# Patient Record
Sex: MALE | Race: BLACK OR AFRICAN AMERICAN | Employment: FULL TIME | URBAN - METROPOLITAN AREA
[De-identification: names, ages, dates, MRNs, and addresses within clinical notes are randomized per-mention and may not be internally consistent; named-entity substitution may affect disease eponyms.]

---

## 2018-04-25 ENCOUNTER — APPOINTMENT (OUTPATIENT)
Dept: GENERAL RADIOLOGY | Age: 43
End: 2018-04-25
Attending: PHYSICIAN ASSISTANT
Payer: SELF-PAY

## 2018-04-25 ENCOUNTER — HOSPITAL ENCOUNTER (EMERGENCY)
Age: 43
Discharge: HOME | End: 2018-04-25
Attending: EMERGENCY MEDICINE
Payer: SELF-PAY

## 2018-04-25 VITALS
RESPIRATION RATE: 18 BRPM | HEART RATE: 76 BPM | SYSTOLIC BLOOD PRESSURE: 162 MMHG | BODY MASS INDEX: 33.34 KG/M2 | TEMPERATURE: 99.9 F | OXYGEN SATURATION: 100 % | HEIGHT: 68 IN | WEIGHT: 220 LBS | DIASTOLIC BLOOD PRESSURE: 109 MMHG

## 2018-04-25 DIAGNOSIS — Z76.0 MEDICATION REFILL: ICD-10-CM

## 2018-04-25 DIAGNOSIS — J20.9 ACUTE BRONCHITIS, UNSPECIFIED ORGANISM: Primary | ICD-10-CM

## 2018-04-25 LAB
FLUAV AG NPH QL IA: NEGATIVE
FLUBV AG NOSE QL IA: NEGATIVE

## 2018-04-25 PROCEDURE — 87804 INFLUENZA ASSAY W/OPTIC: CPT | Performed by: PHYSICIAN ASSISTANT

## 2018-04-25 PROCEDURE — 99283 EMERGENCY DEPT VISIT LOW MDM: CPT

## 2018-04-25 PROCEDURE — 74011250637 HC RX REV CODE- 250/637: Performed by: PHYSICIAN ASSISTANT

## 2018-04-25 PROCEDURE — 71046 X-RAY EXAM CHEST 2 VIEWS: CPT

## 2018-04-25 RX ORDER — AMLODIPINE BESYLATE 5 MG/1
10 TABLET ORAL
Status: COMPLETED | OUTPATIENT
Start: 2018-04-25 | End: 2018-04-25

## 2018-04-25 RX ORDER — METOPROLOL TARTRATE 50 MG/1
100 TABLET ORAL
Status: COMPLETED | OUTPATIENT
Start: 2018-04-25 | End: 2018-04-25

## 2018-04-25 RX ORDER — METOPROLOL SUCCINATE 100 MG/1
TABLET, EXTENDED RELEASE ORAL
Qty: 30 TAB | Refills: 0 | Status: SHIPPED | OUTPATIENT
Start: 2018-04-25

## 2018-04-25 RX ORDER — IBUPROFEN 400 MG/1
800 TABLET ORAL
Status: COMPLETED | OUTPATIENT
Start: 2018-04-25 | End: 2018-04-25

## 2018-04-25 RX ORDER — AMLODIPINE BESYLATE 10 MG/1
TABLET ORAL
Qty: 30 TAB | Refills: 0 | Status: SHIPPED | OUTPATIENT
Start: 2018-04-25

## 2018-04-25 RX ORDER — CODEINE PHOSPHATE AND GUAIFENESIN 10; 100 MG/5ML; MG/5ML
5 SOLUTION ORAL
Qty: 120 ML | Refills: 0 | Status: SHIPPED | OUTPATIENT
Start: 2018-04-25

## 2018-04-25 RX ADMIN — METOPROLOL TARTRATE 100 MG: 50 TABLET ORAL at 21:29

## 2018-04-25 RX ADMIN — AMLODIPINE BESYLATE 10 MG: 5 TABLET ORAL at 21:29

## 2018-04-25 RX ADMIN — IBUPROFEN 800 MG: 400 TABLET ORAL at 21:29

## 2018-04-25 NOTE — LETTER
[address] [place] [address] [facility] DE[facility] 
[address] [facility] [id number] [phone] Work/School Note Date: 4/25/2018 To Whom It May concern: 
 
Teto Butler was seen and treated today in the emergency room by the following provider(s): 
Attending Provider: Bni Cohen MD 
Physician Assistant: Anthony Serrano PA-C. Teto Butler may return to work on 4/27/18. Sincerely, Anthony Serrano PA-C

## 2018-04-26 ENCOUNTER — HOSPITAL ENCOUNTER (EMERGENCY)
Age: 43
Discharge: HOME | End: 2018-04-26
Attending: EMERGENCY MEDICINE
Payer: SELF-PAY

## 2018-04-26 VITALS
TEMPERATURE: 99.3 F | HEIGHT: 68 IN | DIASTOLIC BLOOD PRESSURE: 98 MMHG | WEIGHT: 215 LBS | HEART RATE: 90 BPM | BODY MASS INDEX: 32.58 KG/M2 | RESPIRATION RATE: 16 BRPM | OXYGEN SATURATION: 98 % | SYSTOLIC BLOOD PRESSURE: 145 MMHG

## 2018-04-26 DIAGNOSIS — J20.9 ACUTE BRONCHITIS, UNSPECIFIED ORGANISM: Primary | ICD-10-CM

## 2018-04-26 DIAGNOSIS — Z72.0 TOBACCO USE: ICD-10-CM

## 2018-04-26 PROCEDURE — 99282 EMERGENCY DEPT VISIT SF MDM: CPT

## 2018-04-26 NOTE — ED PROVIDER NOTES
EMERGENCY DEPARTMENT HISTORY AND PHYSICAL EXAM    Date: 4/25/2018  Patient Name: Micki Shetty    History of Presenting Illness     Chief Complaint   Patient presents with    Generalized Body Aches    Chills    Cough         History Provided By: Patient    Chief Complaint: cough, body aches  Duration: 1 Days  Timing:  Acute  Location: chest, body  Quality: Aching  Severity: Moderate  Modifying Factors: none  Associated Symptoms: fever      Additional History (Context): Micki Shetty is a 37 y.o. male with No significant past medical history who presents with cough, body aches and fever x 1 day. Last dose tylenol around 4 PM. No sick contacts. Denies CP, SOB, n/v. No other complaints. PCP: Carmelo Hilliard NP    Current Outpatient Prescriptions   Medication Sig Dispense Refill    metoprolol succinate (TOPROL-XL) 100 mg tablet TAKE 1 TAB BY MOUTH DAILY. 30 Tab 0    amLODIPine (NORVASC) 10 mg tablet TAKE 1 TAB BY MOUTH DAILY. 30 Tab 0    guaiFENesin-codeine (ROBITUSSIN AC) 100-10 mg/5 mL solution Take 5 mL by mouth nightly as needed for Cough. Max Daily Amount: 5 mL. 120 mL 0       Past History     Past Medical History:  Past Medical History:   Diagnosis Date    Abscess     GERD (gastroesophageal reflux disease)     Hypertension        Past Surgical History:  History reviewed. No pertinent surgical history. Family History:  Family History   Problem Relation Age of Onset    Cancer Mother     Diabetes Father     Hypertension Father        Social History:  Social History   Substance Use Topics    Smoking status: Light Tobacco Smoker    Smokeless tobacco: Never Used    Alcohol use No       Allergies:  No Known Allergies      Review of Systems   Review of Systems   Constitutional: Positive for chills and fever. HENT: Positive for sore throat. Respiratory: Positive for cough. Negative for shortness of breath. Musculoskeletal: Positive for myalgias.    All other systems reviewed and are negative. All Other Systems Negative  Physical Exam     Vitals:    04/25/18 2113 04/25/18 2208   BP: (!) 174/118 (!) 162/109   Pulse: 95 76   Resp: 18 18   Temp: 100.1 °F (37.8 °C) 99.9 °F (37.7 °C)   SpO2: 100%    Weight: 99.8 kg (220 lb)    Height: 5' 8\" (1.727 m)      Physical Exam   Constitutional: He is oriented to person, place, and time. He appears well-developed and well-nourished. No distress. HENT:   Head: Normocephalic and atraumatic. Right Ear: Tympanic membrane and ear canal normal.   Left Ear: Tympanic membrane and ear canal normal.   Mouth/Throat: Uvula is midline, oropharynx is clear and moist and mucous membranes are normal.   Eyes: Conjunctivae are normal.   Neck: Normal range of motion. Neck supple. Cardiovascular: Normal rate, regular rhythm and normal heart sounds. Pulmonary/Chest: Effort normal and breath sounds normal. No respiratory distress. He has no wheezes. He has no rales. Musculoskeletal: Normal range of motion. Neurological: He is alert and oriented to person, place, and time. Skin: Skin is warm and dry. Psychiatric: He has a normal mood and affect. His behavior is normal. Judgment and thought content normal.   Nursing note and vitals reviewed. Diagnostic Study Results     Labs -     Recent Results (from the past 12 hour(s))   INFLUENZA A & B AG (RAPID TEST)    Collection Time: 04/25/18  9:15 PM   Result Value Ref Range    Influenza A Antigen NEGATIVE  NEG      Influenza B Antigen NEGATIVE  NEG         Radiologic Studies -   XR CHEST PA LAT    (Results Pending)     CT Results  (Last 48 hours)    None        CXR Results  (Last 48 hours)    None            Medical Decision Making   I am the first provider for this patient. I reviewed the vital signs, available nursing notes, past medical history, past surgical history, family history and social history.     Records Reviewed: Nursing Notes and Old Medical Records    Procedures:  Procedures    Provider Notes (Medical Decision Making):     DDX: flu, pna, other viral illness    Pt well appearing and in NAD. CXR negative. Flu negative. Most likely viral. Pt also requesting refill of HTN meds. PCP f/u for further eval.     MED RECONCILIATION:  No current facility-administered medications for this encounter. Current Outpatient Prescriptions   Medication Sig    metoprolol succinate (TOPROL-XL) 100 mg tablet TAKE 1 TAB BY MOUTH DAILY.  amLODIPine (NORVASC) 10 mg tablet TAKE 1 TAB BY MOUTH DAILY.  guaiFENesin-codeine (ROBITUSSIN AC) 100-10 mg/5 mL solution Take 5 mL by mouth nightly as needed for Cough. Max Daily Amount: 5 mL. Disposition:  home    DISCHARGE NOTE:     Patient is improved, resting quietly and comfortably. The patient will be discharged home.      The patient was reassured that these symptoms do not appear to represent a serious or life threatening condition at this time. Warning signs of worsening condition were discussed and understood by the patient.      Based on patient's age, coexisting illness, exam, and the results of this ED evaluation, the decision to treat as an outpatient was made. Based on the information available at time of discharge, acute pathology requiring immediate intervention was deemed relative unlikely. While it is impossible to completely exclude the possibility of underlying serious disease or worsening of condition, I feel the relative likelihood is extremely low. I discussed this uncertainty with the patient, who understood ED evaluation and treatment and felt comfortable with the outpatient treatment plan.      All questions regarding care, test results, and follow up were answered. The patient is stable and appropriate to discharge. They understand that they should return to the emergency department for any new or worsening symptoms. I stressed the importance of follow up for repeat assessment and possibly further evaluation/treatment.     Follow-up Information     Follow up With Details Comments [address], NP In 2 days As needed [address]  [address]      [address] [facility] EMERGENCY DEPT  Immediately if symptoms worsen [address] [facility]  [phone]          Current Discharge Medication List      START taking these medications    Details   guaiFENesin-codeine (ROBITUSSIN AC) 100-10 mg/5 mL solution Take 5 mL by mouth nightly as needed for Cough. Max Daily Amount: 5 mL. Qty: 120 mL, Refills: 0    Associated Diagnoses: Acute bronchitis, unspecified organism         CONTINUE these medications which have CHANGED    Details   metoprolol succinate (TOPROL-XL) 100 mg tablet TAKE 1 TAB BY MOUTH DAILY. Qty: 30 Tab, Refills: 0    Associated Diagnoses: Medication refill      amLODIPine (NORVASC) 10 mg tablet TAKE 1 TAB BY MOUTH DAILY. Qty: 30 Tab, Refills: 0    Associated Diagnoses: Medication refill               Diagnosis     Clinical Impression:   1. Acute bronchitis, unspecified organism    2.  Medication refill

## 2018-04-26 NOTE — ED TRIAGE NOTES
Patient states onset of generalized body aches, chills, sweats and cough yesterday. States last dose of tylenol at 1600 today.

## 2018-04-26 NOTE — DISCHARGE INSTRUCTIONS
Bronchitis: Care Instructions  Your Care Instructions    Bronchitis is inflammation of the bronchial tubes, which carry air to the lungs. The tubes swell and produce mucus, or phlegm. The mucus and inflamed bronchial tubes make you cough. You may have trouble breathing. Most cases of bronchitis are caused by viruses like those that cause colds. Antibiotics usually do not help and they may be harmful. Bronchitis usually develops rapidly and lasts about 2 to 3 weeks in otherwise healthy people. Follow-up care is a key part of your treatment and safety. Be sure to make and go to all appointments, and call your doctor if you are having problems. It's also a good idea to know your test results and keep a list of the medicines you take. How can you care for yourself at home? · Take all medicines exactly as prescribed. Call your doctor if you think you are having a problem with your medicine. · Get some extra rest.  · Take an over-the-counter pain medicine, such as acetaminophen (Tylenol), ibuprofen (Advil, Motrin), or naproxen (Aleve) to reduce fever and relieve body aches. Read and follow all instructions on the label. · Do not take two or more pain medicines at the same time unless the doctor told you to. Many pain medicines have acetaminophen, which is Tylenol. Too much acetaminophen (Tylenol) can be harmful. · Take an over-the-counter cough medicine that contains dextromethorphan to help quiet a dry, hacking cough so that you can sleep. Avoid cough medicines that have more than one active ingredient. Read and follow all instructions on the label. · Breathe moist air from a humidifier, hot shower, or sink filled with hot water. The heat and moisture will thin mucus so you can cough it out. · Do not smoke. Smoking can make bronchitis worse. If you need help quitting, talk to your doctor about stop-smoking programs and medicines. These can increase your chances of quitting for good.   When should you call for help? Call 911 anytime you think you may need emergency care. For example, call if:  ? · You have severe trouble breathing. ?Call your doctor now or seek immediate medical care if:  ? · You have new or worse trouble breathing. ? · You cough up dark brown or bloody mucus (sputum). ? · You have a new or higher fever. ? · You have a new rash. ? Watch closely for changes in your health, and be sure to contact your doctor if:  ? · You cough more deeply or more often, especially if you notice more mucus or a change in the color of your mucus. ? · You are not getting better as expected. Where can you learn more? Go to http://carine-gisela.info/. Enter H333 in the search box to learn more about \"Bronchitis: Care Instructions. \"  Current as of: May 12, 2017  Content Version: 11.4  © 3480-3182 [employer]. Care instructions adapted under license by [employer] (which disclaims liability or warranty for this information). If you have questions about a medical condition or this instruction, always ask your healthcare professional. [address] any warranty or liability for your use of this information.

## 2018-04-26 NOTE — LETTER
[address] [place] [address] [facility] EMERGENC[facility] DEPT 
[address] [facility] [id number] [phone] Work/School Note Date: 4/26/2018 To Whom It May concern: 
 
Mino Reyes was seen and treated today in the emergency room by the following provider(s): 
Attending Provider: Abdullahi Burns MD 
Physician Assistant: ALEXANDREA Puckett. Mino Reyes may return to work on April 28 th, 2018. Sincerely, ALEXANDREA Puckett

## 2018-04-27 NOTE — ED PROVIDER NOTES
EMERGENCY DEPARTMENT HISTORY AND PHYSICAL EXAM    9:07 PM      Date: 4/26/2018  Patient Name: Cassandra Gomez    History of Presenting Illness     Chief Complaint   Patient presents with    Generalized Body Aches         History Provided By: Patient    Chief Complaint: has been  Having body ache and non productive and fever of 100 and was seen in the ER yesterday and underwent influenza and CXR which was negative. Per patient still feels of body ache and tactile fever. No diarrhea, chest pain, acute onset of SOB, palpitation or abd pain. Smokes tobacco x 5 yrs. Duration:  Days  Timing:  Gradual  Location: as noted above  Quality: Aching  Severity: Moderate  Modifying Factors: none  Associated Symptoms: denies any other associated signs or symptoms      Additional History (Context): Cassandra Gomez is a 37 y.o. male with hypertension and GERD who presents with sx as noted above. PCP: Lamar Jules NP    Current Outpatient Prescriptions   Medication Sig Dispense Refill    metoprolol succinate (TOPROL-XL) 100 mg tablet TAKE 1 TAB BY MOUTH DAILY. 30 Tab 0    amLODIPine (NORVASC) 10 mg tablet TAKE 1 TAB BY MOUTH DAILY. 30 Tab 0    guaiFENesin-codeine (ROBITUSSIN AC) 100-10 mg/5 mL solution Take 5 mL by mouth nightly as needed for Cough. Max Daily Amount: 5 mL. 120 mL 0       Past History     Past Medical History:  Past Medical History:   Diagnosis Date    Abscess     GERD (gastroesophageal reflux disease)     Hypertension        Past Surgical History:  History reviewed. No pertinent surgical history.     Family History:  Family History   Problem Relation Age of Onset    Cancer Mother     Diabetes Father     Hypertension Father        Social History:  Social History   Substance Use Topics    Smoking status: Light Tobacco Smoker    Smokeless tobacco: Never Used    Alcohol use No       Allergies:  No Known Allergies      Review of Systems       Review of Systems   Constitutional: Positive for chills and fever.   HENT: Negative for sinus pain, sinus pressure and sore throat. Respiratory: Positive for cough. Negative for chest tightness, shortness of breath and wheezing. All other systems reviewed and are negative. Physical Exam     Visit Vitals    BP (!) 145/98    Pulse 90    Temp 99.3 °F (37.4 °C)    Resp 16    Ht 5' 8\" (1.727 m)    Wt 97.5 kg (215 lb)    SpO2 98%    BMI 32.69 kg/m2         Physical Exam   Constitutional: He is oriented to person, place, and time. He appears well-developed and well-nourished. No distress. HENT:   Head: Normocephalic and atraumatic. Eyes: Conjunctivae and EOM are normal. Pupils are equal, round, and reactive to light. Neck: Normal range of motion. Cardiovascular: Normal rate, regular rhythm and normal heart sounds. Pulmonary/Chest: Effort normal and breath sounds normal. No respiratory distress. He has no wheezes. He has no rales. Abdominal: Soft. Bowel sounds are normal. He exhibits no distension. There is no tenderness. There is no rebound and no guarding. Musculoskeletal: Normal range of motion. Neurological: He is alert and oriented to person, place, and time. Skin: Skin is warm. He is not diaphoretic. Psychiatric: He has a normal mood and affect. His behavior is normal. Judgment and thought content normal.         Diagnostic Study Results     Labs -  No results found for this or any previous visit (from the past 12 hour(s)). Radiologic Studies -   No orders to display         Medical Decision Making   I am the first provider for this patient. I reviewed the vital signs, available nursing notes, past medical history, past surgical history, family history and social history. Vital Signs-Reviewed the patient's vital signs. Provider Notes (Medical Decision Making):   Recommend on increasing fluid intake and tylenol ibuprofen for body ache an fever. Diagnosis     Clinical Impression:   1.  Acute bronchitis, unspecified organism    2. Tobacco use        Disposition: HOME    Follow-up Information     Follow up With Details Comments [address], NP In 1 day  [address]  [address]      [address] [facility] EMERGENCY DEPT  As needed, If symptoms worsen [facility] [facility]  [phone]           Patient's Medications   Start Taking    No medications on file   Continue Taking    AMLODIPINE (NORVASC) 10 MG TABLET    TAKE 1 TAB BY MOUTH DAILY. GUAIFENESIN-CODEINE (ROBITUSSIN AC) 100-10 MG/5 ML SOLUTION    Take 5 mL by mouth nightly as needed for Cough. Max Daily Amount: 5 mL. METOPROLOL SUCCINATE (TOPROL-XL) 100 MG TABLET    TAKE 1 TAB BY MOUTH DAILY.    These Medications have changed    No medications on file   Stop Taking    No medications on file

## 2018-04-27 NOTE — DISCHARGE INSTRUCTIONS
Learning About Benefits From Quitting Smoking  How does quitting smoking make you healthier? If you're thinking about quitting smoking, you may have a few reasons to be smoke-free. Your health may be one of them. · When you quit smoking, you lower your risks for cancer, lung disease, heart attack, stroke, blood vessel disease, and blindness from macular degeneration. · When you're smoke-free, you get sick less often, and you heal faster. You are less likely to get colds, flu, bronchitis, and pneumonia. · As a nonsmoker, you may find that your mood is better and you are less stressed. When and how will you feel healthier? Quitting has real health benefits that start from day 1 of being smoke-free. And the longer you stay smoke-free, the healthier you get and the better you feel. The first hours  · After just 20 minutes, your blood pressure and heart rate go down. That means there's less stress on your heart and blood vessels. · Within 12 hours, the level of carbon monoxide in your blood drops back to normal. That makes room for more oxygen. With more oxygen in your body, you may notice that you have more energy than when you smoked. After 2 weeks  · Your lungs start to work better. · Your risk of heart attack starts to drop. After 1 month  · When your lungs are clear, you cough less and breathe deeper, so it's easier to be active. · Your sense of taste and smell return. That means you can enjoy food more than you have since you started smoking. Over the years  · After 1 year, your risk of heart disease is half what it would be if you kept smoking. · After 5 years, your risk of stroke starts to shrink. Within a few years after that, it's about the same as if you'd never smoked. · After 10 years, your risk of dying from lung cancer is cut by about half. And your risk for many other types of cancer is lower too. How would quitting help others in your life?   When you quit smoking, you improve the health of everyone who now breathes in your smoke. · Their heart, lung, and cancer risks drop, much like yours. · They are sick less. For babies and small children, living smoke-free means they're less likely to have ear infections, pneumonia, and bronchitis. · If you're a woman who is or will be pregnant someday, quitting smoking means a healthier . · Children who are close to you are less likely to become adult smokers. Where can you learn more? Go to http://carine-gisela.info/. Enter 052 [id number] 72 11 in the search box to learn more about \"Learning About Benefits From Quitting Smoking. \"  Current as of: 2017  Content Version: 11.4  © 9169-6691 [employer] Care instructions adapted under license by [employer] (which disclaims liability or warranty for this information). If you have questions about a medical condition or this instruction, always ask your healthcare professional. [place] [address] any warranty or liability for your use of this information. [employer] Activation    Thank you for requesting access to [employer]. Please follow the instructions below to securely access and download your online medical record. [employer] allows you to send messages to your doctor, view your test results, renew your prescriptions, schedule appointments, and more. How Do I Sign Up? 1. In your internet browser, go to [email or web address]  2. Click on the First Time User? Click Here link in the Sign In box. You will be redirect to the New Member Sign Up page. 3. Enter your [employer] Access Code exactly as it appears below. You will not need to use this code after youve completed the sign-up process. If you do not sign up before the expiration date, you must request a new code. [employer] Access Code: [email or web address]  Expires: 2018  9:11 PM (This is the date your [employer] access code will )    4.  Enter the last four digits of your Social Security Number (xxxx) and Date of Birth (mm/dd/yyyy) as indicated and click Submit. You will be taken to the next sign-up page. 5. Create a [employer] ID. This will be your [employer] login ID and cannot be changed, so think of one that is secure and easy to remember. 6. Create a [employer] password. You can change your password at any time. 7. Enter your Password Reset Question and Answer. This can be used at a later time if you forget your password. 8. Enter your e-mail address. You will receive e-mail notification when new information is available in [address] 9. Click Sign Up. You can now view and download portions of your medical record. 10. Click the Download Summary menu link to download a portable copy of your medical information. Additional Information    If you have questions, please visit the Frequently Asked Questions section of the [employer] website at [email or web address] Remember, [employer] is NOT to be used for urgent needs. For medical emergencies, dial 911.

## 2018-04-27 NOTE — ED TRIAGE NOTES
Patient report to ED requesting a work note extension. Patient seen on 4/25/18 and states he is not feeling any better.

## 2020-12-25 ENCOUNTER — HOSPITAL ENCOUNTER (EMERGENCY)
Age: 45
Discharge: HOME | End: 2020-12-26
Attending: EMERGENCY MEDICINE
Payer: OTHER GOVERNMENT

## 2020-12-25 VITALS
TEMPERATURE: 99.1 F | DIASTOLIC BLOOD PRESSURE: 115 MMHG | HEART RATE: 98 BPM | WEIGHT: 220 LBS | HEIGHT: 68 IN | RESPIRATION RATE: 20 BRPM | OXYGEN SATURATION: 100 % | SYSTOLIC BLOOD PRESSURE: 177 MMHG | BODY MASS INDEX: 33.34 KG/M2

## 2020-12-25 DIAGNOSIS — R04.0 EPISTAXIS: Primary | ICD-10-CM

## 2020-12-25 PROCEDURE — 99283 EMERGENCY DEPT VISIT LOW MDM: CPT

## 2020-12-25 PROCEDURE — 75810000284 HC CNTRL NASAL HEMORHRAGE SIMPLE

## 2020-12-26 PROCEDURE — 74011250637 HC RX REV CODE- 250/637: Performed by: EMERGENCY MEDICINE

## 2020-12-26 RX ORDER — AMLODIPINE BESYLATE 5 MG/1
10 TABLET ORAL
Status: COMPLETED | OUTPATIENT
Start: 2020-12-26 | End: 2020-12-26

## 2020-12-26 RX ORDER — AMLODIPINE BESYLATE 10 MG/1
10 TABLET ORAL DAILY
Qty: 20 TAB | Refills: 0 | Status: SHIPPED | OUTPATIENT
Start: 2020-12-26 | End: 2021-01-15

## 2020-12-26 RX ORDER — METOPROLOL SUCCINATE 100 MG/1
100 TABLET, EXTENDED RELEASE ORAL DAILY
Qty: 20 TAB | Refills: 0 | Status: SHIPPED | OUTPATIENT
Start: 2020-12-26 | End: 2021-01-15

## 2020-12-26 RX ADMIN — AMLODIPINE BESYLATE 10 MG: 5 TABLET ORAL at 00:40

## 2020-12-26 NOTE — ED PROVIDER NOTES
EMERGENCY DEPARTMENT HISTORY AND PHYSICAL EXAM    12:06 AM  Date: 12/25/2020  Patient Name: America Boyd    History of Presenting Illness     Chief Complaint   Patient presents with    Epistaxis        History Provided By: Patient    HPI: America Boyd is a 39 y.o. male with history of hypertension and medication noncompliance. Patient is presenting with epistaxis that started approximately 2 hours prior to arrival.  At that stopped before he got here but then it started again. Denies headache or other neurological symptoms. This is the first time he has epistaxis. Patient has not been taking his blood pressure medication in few months and he does not even remember where he placed them. Location:  Severity:  Timing/course: Onset/Duration:     PCP: None    Past History     Past Medical History:  Past Medical History:   Diagnosis Date    Abscess     GERD (gastroesophageal reflux disease)     Hypertension        Past Surgical History:  History reviewed. No pertinent surgical history. Family History:  Family History   Problem Relation Age of Onset   Eyvonne Clonts Cancer Mother     Diabetes Father     Hypertension Father        Social History:  Social History     Tobacco Use    Smoking status: Current Every Day Smoker     Packs/day: 0.00    Smokeless tobacco: Never Used    Tobacco comment: 3 black & mild cigars per day   Substance Use Topics    Alcohol use: No     Alcohol/week: 0.0 standard drinks    Drug use: Yes     Types: Marijuana       Allergies:  No Known Allergies    Review of Systems   Review of Systems   HENT: Positive for nosebleeds. All other systems reviewed and are negative. Physical Exam     Patient Vitals for the past 12 hrs:   Temp Pulse Resp BP SpO2   12/25/20 2349    (!) 177/115    12/25/20 2345 99.1 °F (37.3 °C) 98 20 (!) 193/106 100 %       Physical Exam  Vitals signs and nursing note reviewed. Constitutional:       Appearance: Normal appearance.    HENT:      Head: Normocephalic and atraumatic. Nose:      Left Nostril: Epistaxis present. Eyes:      Extraocular Movements: Extraocular movements intact. Neck:      Musculoskeletal: Normal range of motion and neck supple. Cardiovascular:      Rate and Rhythm: Normal rate. Pulmonary:      Effort: Pulmonary effort is normal. No respiratory distress. Musculoskeletal: Normal range of motion. Neurological:      General: No focal deficit present. Mental Status: He is alert and oriented to person, place, and time. Psychiatric:         Mood and Affect: Mood normal.         Behavior: Behavior normal.           Diagnostic Study Results     Labs -  No results found for this or any previous visit (from the past 12 hour(s)). Radiologic Studies -   No results found. Medical Decision Making     ED Course: Progress Notes, Reevaluation, and Consults:    12:06 AM Initial assessment performed. The patients presenting problems have been discussed, and they/their family are in agreement with the care plan formulated and outlined with them. I have encouraged them to ask questions as they arise throughout their visit. Provider Notes (Medical Decision Making): 51-year-old male presenting with epistaxis and elevated blood pressure. During my evaluation he had some active bleeding that was anterior from the left nostril. Given his elevated blood pressure I could not use phenylephrine or Afrin so I used Merisel packing and nasal clamp and it was applied for about 20 minutes. And then remove the packing and the clamp and there was no active bleeding anymore and the patient was tolerating p.o. and feels comfortable. It was given also his blood pressure medication and refill his prescription. I will send him home with a nasal clamp and provided him with care instructions and return precautions.     Procedures: Epistaxis Management    Date/Time: 12/26/2020 3:54 AM  Performed by: Latoya Carr MD  Authorized by: Magaly Marquis MD     Consent:     Consent obtained:  Verbal    Consent given by:  Patient    Risks discussed:  Bleeding, nasal injury and pain    Alternatives discussed:  Delayed treatment  Anesthesia (see MAR for exact dosages): Anesthesia method:  None  Procedure details:     Treatment site:  L anterior    Treatment method:  Merocel sponge    Treatment complexity:  Limited    Treatment episode: initial    Post-procedure details:     Assessment:  Bleeding stopped    Patient tolerance of procedure: Tolerated well, no immediate complications        Critical Care Time:     Vital Signs-Reviewed the patient's vital signs. Reviewed pt's pulse ox reading. EKG: Interpreted by the EP. Time Interpreted:    Rate:    Rhythm:    Interpretation:   Comparison:     Records Reviewed: Nursing Notes (Time of Review: 12:06 AM)  -I am the first provider for this patient.  -I reviewed the vital signs, available nursing notes, past medical history, past surgical history, family history and social history. Current Outpatient Medications   Medication Sig Dispense Refill    metoprolol succinate (TOPROL-XL) 100 mg tablet TAKE 1 TAB BY MOUTH DAILY. 30 Tab 0    amLODIPine (NORVASC) 10 mg tablet TAKE 1 TAB BY MOUTH DAILY. 30 Tab 0    guaiFENesin-codeine (ROBITUSSIN AC) 100-10 mg/5 mL solution Take 5 mL by mouth nightly as needed for Cough. Max Daily Amount: 5 mL. 120 mL 0        Clinical Impression     Clinical Impression: No diagnosis found. Disposition: dc        This note was dictated utilizing voice recognition software which may lead to typographical errors. I apologize in advance if the situation occurs. If questions arise please do not hesitate to contact me or call our department.     Magaly Elaine MD  12:06 AM

## 2020-12-26 NOTE — ED NOTES
12:42 AM  12/26/20     Discharge instructions given to Darian Thompson (name) with verbalization of understanding. Patient accompanied by self. Patient discharged with the following prescriptions metoprolol & amlodipine. Patient discharged to home (destination).       Tessa Grady

## 2022-08-23 ENCOUNTER — HOSPITAL ENCOUNTER (EMERGENCY)
Age: 47
Discharge: HOME | End: 2022-08-23
Attending: EMERGENCY MEDICINE
Payer: OTHER GOVERNMENT

## 2022-08-23 ENCOUNTER — APPOINTMENT (OUTPATIENT)
Dept: GENERAL RADIOLOGY | Age: 47
End: 2022-08-23
Attending: EMERGENCY MEDICINE
Payer: OTHER GOVERNMENT

## 2022-08-23 VITALS
DIASTOLIC BLOOD PRESSURE: 89 MMHG | WEIGHT: 235 LBS | RESPIRATION RATE: 18 BRPM | HEART RATE: 82 BPM | TEMPERATURE: 98.6 F | BODY MASS INDEX: 35.61 KG/M2 | HEIGHT: 68 IN | OXYGEN SATURATION: 100 % | SYSTOLIC BLOOD PRESSURE: 145 MMHG

## 2022-08-23 DIAGNOSIS — M54.16 LUMBAR RADICULOPATHY: Primary | ICD-10-CM

## 2022-08-23 DIAGNOSIS — R03.0 ELEVATED BLOOD PRESSURE READING: ICD-10-CM

## 2022-08-23 PROCEDURE — 96372 THER/PROPH/DIAG INJ SC/IM: CPT

## 2022-08-23 PROCEDURE — 74011250636 HC RX REV CODE- 250/636: Performed by: EMERGENCY MEDICINE

## 2022-08-23 PROCEDURE — 99284 EMERGENCY DEPT VISIT MOD MDM: CPT

## 2022-08-23 PROCEDURE — 73502 X-RAY EXAM HIP UNI 2-3 VIEWS: CPT

## 2022-08-23 RX ORDER — METOPROLOL SUCCINATE 200 MG/1
200 TABLET, EXTENDED RELEASE ORAL DAILY
COMMUNITY
Start: 2022-07-03

## 2022-08-23 RX ORDER — KETOROLAC TROMETHAMINE 15 MG/ML
15 INJECTION, SOLUTION INTRAMUSCULAR; INTRAVENOUS
Status: COMPLETED | OUTPATIENT
Start: 2022-08-23 | End: 2022-08-23

## 2022-08-23 RX ORDER — CYCLOBENZAPRINE HCL 10 MG
10 TABLET ORAL
Qty: 8 TABLET | Refills: 0 | Status: SHIPPED | OUTPATIENT
Start: 2022-08-23

## 2022-08-23 RX ORDER — METHYLPREDNISOLONE 4 MG/1
4 TABLET ORAL
Qty: 1 DOSE PACK | Refills: 0 | Status: SHIPPED | OUTPATIENT
Start: 2022-08-23

## 2022-08-23 RX ORDER — ACETAMINOPHEN AND CODEINE PHOSPHATE 300; 30 MG/1; MG/1
1 TABLET ORAL
Qty: 4 TABLET | Refills: 0 | Status: SHIPPED | OUTPATIENT
Start: 2022-08-23 | End: 2022-08-26

## 2022-08-23 RX ADMIN — KETOROLAC TROMETHAMINE 15 MG: 15 INJECTION, SOLUTION INTRAMUSCULAR; INTRAVENOUS at 07:36

## 2022-08-23 NOTE — Clinical Note
[address] [place] [address] [facility]  [id number] [id number] [phone]    Work/School Note    Date: 8/23/2022    To Whom It May concern:      Christina Gonzalez was seen and treated today in the emergency room by the following provider(s):  Attending Provider: Daisha Garcia MD.      Christina Gonzalez is excused from work/school on 08/23/22. He is clear to return to work/school on 08/24/22.         Sincerely,          Cynthia Lee MD

## 2022-08-23 NOTE — ED NOTES
Introduced self to pt, pt c/o of left hip pain that radiates down from left hip to left leg since Saturday. Pt denies any recent fall or injury to hip. Pt sitting in semi-flowers position, BP cuff and pulse ox placed on pt, call bell within reach, will continue to monitor.

## 2022-08-23 NOTE — ED TRIAGE NOTES
Patient states \"his left hip has been hurting since Saturday night\". Patient denies any injuries to his left hip, he has chronic pain in his left hip\". Patient states Diana Bass has been taking tylenol for his pain and his last dose of tylenol was last night around 2000 hrs\".

## 2022-08-23 NOTE — DISCHARGE INSTRUCTIONS
Make sure to follow-up with the spine center for reevaluation. Avoid heavy lifting and take medications as prescribed. I have given you some Tylenol with codeine but only take that if you are not getting any relief with over-the-counter Tylenol and your muscle relaxants and steroids. Please return if you are at all worsened or concerned, have difficulty walking, new numbness, new weakness, or you are at all concerned.

## 2022-08-23 NOTE — ED PROVIDER NOTES
EMERGENCY DEPARTMENT HISTORY AND PHYSICAL EXAM    7:36 AM      Date: 8/23/2022  Patient Name: Lynn Nunez    History of Presenting Illness     Chief Complaint   Patient presents with    Hip Pain         History Provided By: Patient  Location/Duration/Severity/Modifying factors   Patient is a 77-year-old male with a history of hypertension that presents emergency department complaint of left-sided hip pain radiating down to his knee. Patient notes that he was off last week and moved a dresser for his wife and said he felt a bit of a twinge into his back and did not think much of it and it improved over the next 48 hours. The patient subsequently has developed some pain into his left hip and it radiates down his leg and increases when he coughs or when he sneezes. Patient said he is never had symptoms like this before. Patient denies any numbness or tingling into his legs or any difficulties with bowel or bladder use. The patient denies any weakness into his leg. Patient denies any fevers or chills. Patient denies any previous back injuries. Patient works in [place] and occasionally smokes cigars, denies alcohol use, denies any drug use. There are no other known aggravating or alleviating factors. PCP: None    Current Facility-Administered Medications   Medication Dose Route Frequency Provider Last Rate Last Admin    ketorolac (TORADOL) injection 15 mg  15 mg IntraMUSCular NOW Hola Dia MD         Current Outpatient Medications   Medication Sig Dispense Refill    Toprol  mg XL tablet Take 200 mg by mouth daily. acetaminophen-codeine (Tylenol-Codeine #3) 300-30 mg per tablet Take 1 Tablet by mouth every four (4) hours as needed for Pain (For pain not responding to over-the-counter Tylenol) for up to 3 days. Max Daily Amount: 6 Tablets. 4 Tablet 0    cyclobenzaprine (FLEXERIL) 10 mg tablet Take 1 Tablet by mouth three (3) times daily as needed for Muscle Spasm(s).  8 Tablet 0 methylPREDNISolone (Medrol, Tan,) 4 mg tablet Take 1 Tablet by mouth Specific Days and Specific Times. 1 Dose Pack 0    amLODIPine (NORVASC) 10 mg tablet TAKE 1 TAB BY MOUTH DAILY. 30 Tab 0    metoprolol succinate (TOPROL-XL) 100 mg tablet TAKE 1 TAB BY MOUTH DAILY. 30 Tab 0    guaiFENesin-codeine (ROBITUSSIN AC) 100-10 mg/5 mL solution Take 5 mL by mouth nightly as needed for Cough. Max Daily Amount: 5 mL. 120 mL 0       Past History     Past Medical History:  Past Medical History:   Diagnosis Date    Abscess     GERD (gastroesophageal reflux disease)     Hypertension        Past Surgical History:  History reviewed. No pertinent surgical history. Family History:  Family History   Problem Relation Age of Onset    Cancer Mother     Diabetes Father     Hypertension Father        Social History:  Social History     Tobacco Use    Smoking status: Every Day     Packs/day: 0.00     Types: Cigarettes    Smokeless tobacco: Never    Tobacco comments:     3 black & mild cigars per day   Vaping Use    Vaping Use: Never used   Substance Use Topics    Alcohol use: No     Alcohol/week: 0.0 standard drinks    Drug use: Yes     Types: Marijuana       Allergies:  No Known Allergies      Review of Systems       Review of Systems   Constitutional:  Negative for activity change, fatigue and fever. HENT:  Negative for congestion and rhinorrhea. Eyes:  Negative for visual disturbance. Respiratory:  Negative for shortness of breath. Cardiovascular:  Negative for chest pain and palpitations. Gastrointestinal:  Negative for abdominal pain, diarrhea, nausea and vomiting. Genitourinary:  Negative for dysuria and hematuria. Musculoskeletal:  Positive for arthralgias and back pain. Skin:  Negative for rash. Neurological:  Negative for dizziness, weakness and light-headedness. All other systems reviewed and are negative.       Physical Exam   Visit Vitals  BP (!) 145/89 (BP 1 Location: Left upper arm, BP Patient Position: At rest;Sitting)   Pulse 82   Temp 98.6 °F (37 °C)   Resp 18   Ht 5' 8\" (1.727 m)   Wt 106.6 kg (235 lb)   SpO2 100%   BMI 35.73 kg/m²         Physical Exam  Vitals and nursing note reviewed. Constitutional:       General: He is not in acute distress. Appearance: He is well-developed. HENT:      Head: Normocephalic and atraumatic. Right Ear: External ear normal.      Left Ear: External ear normal.      Nose: Nose normal.   Eyes:      General: No scleral icterus. Conjunctiva/sclera: Conjunctivae normal.      Pupils: Pupils are equal, round, and reactive to light. Neck:      Thyroid: No thyromegaly. Vascular: No JVD. Trachea: No tracheal deviation. Cardiovascular:      Rate and Rhythm: Normal rate and regular rhythm. Heart sounds: Normal heart sounds. No murmur heard. No friction rub. No gallop. Pulmonary:      Effort: Pulmonary effort is normal.      Breath sounds: Normal breath sounds. Chest:      Chest wall: No tenderness. Abdominal:      General: Bowel sounds are normal. There is no distension. Palpations: Abdomen is soft. Tenderness: There is no abdominal tenderness. There is no guarding or rebound. Musculoskeletal:         General: No tenderness. Normal range of motion. Cervical back: Normal range of motion and neck supple. Comments: Left-sided paraspinal lumbar tenderness, straight leg raising increasing pain at the hip and down the left leg, pain does not go beyond the knee  No pain with internal/external rotation of the left hip   Lymphadenopathy:      Cervical: No cervical adenopathy. Skin:     General: Skin is warm and dry. Neurological:      Mental Status: He is alert and oriented to person, place, and time. Cranial Nerves: No cranial nerve deficit. Coordination: Coordination normal.      Comments: No sensory loss, Gait normal, Motor 5/5   Psychiatric:         Behavior: Behavior normal.         Thought Content:  Thought content normal.         Judgment: Judgment normal.         Diagnostic Study Results     Labs -  No results found for this or any previous visit (from the past 12 hour(s)). Radiologic Studies -   XR HIP LT W OR WO PELV 2-3 VWS    (Results Pending)     No acute process interpreted by me    Medical Decision Making   I am the first provider for this patient. I reviewed the vital signs, available nursing notes, past medical history, past surgical history, family history and social history. Vital Signs-Reviewed the patient's vital signs. Records Reviewed: Nursing Notes, Old Medical Records, Previous Radiology Studies, and Previous Laboratory Studies (Time of Review: 7:36 AM)    ED Course: Progress Notes, Reevaluation, and Consults: The patient is somewhat improved after Toradol and x-ray shows no clear bony abnormality of the hip. I suspect the patient has lumbar radiculopathy and will start supportive care with steroids and patient will follow closely with the spine center for reevaluation to see if further testing or imaging is needed. The patient was educated on signs worsening and will return if at all worsened or concerned. Workup and recommendations were reviewed with the patient and all questions were answered. The patient understands the plan and will proceed with close outpatient care. I have encouraged the patient to return if at all worsened or concerned. Renzo Marley DO 7:51 AM      Provider Notes (Medical Decision Making):   MDM  Number of Diagnoses or Management Options  Elevated blood pressure reading  Lumbar radiculopathy  Diagnosis management comments: Patient is a 42-year-old male with history of hypertension who presents emergency department complaint of left hip pain that increases with Valsalva. Suspect the patient has lumbar radiculopathy and likely due to injury that occurred while moving a dresser at home.   Will x-ray his left hip for bony abnormalities, and if reassuring we will plan to discharge the patient with steroids, supportive care, and close outpatient care with the spine center. Stormy Tamayo,  7:39 AM          Procedures          Diagnosis     Clinical Impression:   1. Lumbar radiculopathy        Disposition: DC    Follow-up Information       Follow up With Specialties Details Why [address] [facility] In 2 days  [address]    [id number] [facility] EMERGENCY DEPT Emergency Medicine  As needed, If symptoms worsen [address] [facility]  [phone]             Patient's Medications   Start Taking    ACETAMINOPHEN-CODEINE (TYLENOL-CODEINE #3) 300-30 MG PER TABLET    Take 1 Tablet by mouth every four (4) hours as needed for Pain (For pain not responding to over-the-counter Tylenol) for up to 3 days. Max Daily Amount: 6 Tablets. CYCLOBENZAPRINE (FLEXERIL) 10 MG TABLET    Take 1 Tablet by mouth three (3) times daily as needed for Muscle Spasm(s). METHYLPREDNISOLONE (MEDROL, SUNDEEP,) 4 MG TABLET    Take 1 Tablet by mouth Specific Days and Specific Times. Continue Taking    AMLODIPINE (NORVASC) 10 MG TABLET    TAKE 1 TAB BY MOUTH DAILY. GUAIFENESIN-CODEINE (ROBITUSSIN AC) 100-10 MG/5 ML SOLUTION    Take 5 mL by mouth nightly as needed for Cough. Max Daily Amount: 5 mL. METOPROLOL SUCCINATE (TOPROL-XL) 100 MG TABLET    TAKE 1 TAB BY MOUTH DAILY. TOPROL  MG XL TABLET    Take 200 mg by mouth daily. These Medications have changed    No medications on file   Stop Taking    No medications on file     Disclaimer: Sections of this note are dictated using utilizing voice recognition software. Minor typographical errors may be present. If questions arise, please do not hesitate to contact me or call our department.

## 2022-11-09 ENCOUNTER — HOSPITAL ENCOUNTER (EMERGENCY)
Age: 47
Discharge: HOME | End: 2022-11-09
Attending: EMERGENCY MEDICINE
Payer: OTHER GOVERNMENT

## 2022-11-09 VITALS
HEART RATE: 90 BPM | HEIGHT: 68 IN | BODY MASS INDEX: 33.34 KG/M2 | SYSTOLIC BLOOD PRESSURE: 157 MMHG | OXYGEN SATURATION: 100 % | DIASTOLIC BLOOD PRESSURE: 105 MMHG | TEMPERATURE: 97.7 F | RESPIRATION RATE: 18 BRPM | WEIGHT: 220 LBS

## 2022-11-09 DIAGNOSIS — M54.31 SCIATICA OF RIGHT SIDE: Primary | ICD-10-CM

## 2022-11-09 PROCEDURE — 99284 EMERGENCY DEPT VISIT MOD MDM: CPT

## 2022-11-09 PROCEDURE — 74011250636 HC RX REV CODE- 250/636: Performed by: EMERGENCY MEDICINE

## 2022-11-09 PROCEDURE — 96372 THER/PROPH/DIAG INJ SC/IM: CPT

## 2022-11-09 RX ORDER — KETOROLAC TROMETHAMINE 15 MG/ML
15 INJECTION, SOLUTION INTRAMUSCULAR; INTRAVENOUS ONCE
Status: COMPLETED | OUTPATIENT
Start: 2022-11-09 | End: 2022-11-09

## 2022-11-09 RX ORDER — KETOROLAC TROMETHAMINE 15 MG/ML
30 INJECTION, SOLUTION INTRAMUSCULAR; INTRAVENOUS ONCE
Status: DISCONTINUED | OUTPATIENT
Start: 2022-11-09 | End: 2022-11-09

## 2022-11-09 RX ORDER — NAPROXEN 375 MG/1
375 TABLET ORAL 2 TIMES DAILY WITH MEALS
Qty: 15 TABLET | Refills: 0 | Status: SHIPPED | OUTPATIENT
Start: 2022-11-09

## 2022-11-09 RX ORDER — PREDNISONE 10 MG/1
TABLET ORAL
Qty: 1 DOSE PACK | Refills: 0 | Status: SHIPPED | OUTPATIENT
Start: 2022-11-09 | End: 2022-11-15

## 2022-11-09 RX ORDER — METHOCARBAMOL 750 MG/1
750 TABLET, FILM COATED ORAL
Qty: 10 TABLET | Refills: 0 | Status: SHIPPED | OUTPATIENT
Start: 2022-11-09 | End: 2022-11-23

## 2022-11-09 RX ADMIN — KETOROLAC TROMETHAMINE 15 MG: 15 INJECTION, SOLUTION INTRAMUSCULAR; INTRAVENOUS at 06:36

## 2022-11-09 NOTE — ED TRIAGE NOTES
A&O male with right sided lower back that radiates down right leg x 2 days. No relief from OTC pain medications. Does not recall any specific injury/trauma.

## 2022-11-09 NOTE — DISCHARGE INSTRUCTIONS
Medications: Please take all medications as prescribed and read all medication instructions/inserts. Sedating medications received: It is not uncommon to receive medications in the ER which can impair your capability to drive. If you received these medications then you should NOT drive as this could result in an accident. Followup: The exam and treatment you received in the Emergency Department were for an urgent problem and are not intended as complete care. It is important that you follow-up with a doctor, nurse practitioner, or physician assistant to:  (1) confirm your diagnosis,  (2) re-evaluation of changes in your illness and treatment, and  (3) for ongoing care (4) to review your testing performed in the emergency department and determine if further evaluation is required (especially for findings not related to your visit). Some disease processes can present early or atypically, If your symptoms become worse or you do not improve as expected and you are unable to reach your usual health care provider, you should return to the Emergency Department. We are available 24 hours a day. Incidental Findings: We attempt to communicate incidental and other abnormal findings with you today (these may or may not be related to your visit). These findings may require further testing so it is imperative that you followup with your primary care provider in 1-2 days provider to go over your test results and get further evaluation if needed.
General

## 2022-11-09 NOTE — PROGRESS NOTES
Ketorolac 15mg IM was therapeutically interchanged for ketorolac 30mg per the P&T Committee approved Therapeutic Interchanges Policy.     Radha Mayfield, [facility], Pharmacist  11/9/2022 6:27 AM

## 2022-11-09 NOTE — ED PROVIDER NOTES
[facility] EMERGENCY DEPT      ICD-10-CM ICD-9-CM    1. Sciatica of right side  M54.31 724. 3             Assessment/Differential Diagnosis:  Consider musculoskeletal strain, degenerative disc disease, referred pain, vertebral compression fracture, disk herniation, metastatic disease, vertebral osteomyelitis, epidural abscess      ED Course/Medical Decision Making:  Pt is 52 y.o. male who is well appearing, nontoxic. The neurological exam is normal and pt is afebrile. Signs and symptoms consistent with mechanical low back pain / myofacial strain or exacerbation of previous symptoms. Do not think pt needs emergent MRI. Not suspicious of vascular or infectious etiology. Doubt acute fracture. No evidence for cauda equina syndrome. Plan will be for analgesics, supportive and expectant care. Fu with PCP for further evaluation. Discussed with pt that outpt MRI would be diagnostic test of choice and the need to return with any new or worsening symptoms chris weakness, bowel/bladder issues, neuro changes. Disposition:  Discharged      Chief Complaint   Patient presents with    Back Pain       HPI:  Jacque Jenkins is a 52 y.o. male with history of HTN who presents with right-sided lower back pain radiating into his right leg. Patient states that it started gradually approximately 1 day ago and then it gradually got worse over the period of the night. Patient says that he was not able to sleep and that his symptoms are rated as severe and do not improve with any type of positioning. Tried some over-the-counter medications as well which did not alleviate his symptoms. Denies bowel or bladder symptoms, no loss of strength in extremities, no saddle anesthesias. There has been no history of trauma. Nothing seems to make the pain better. Review of Systems:    Review of Systems   Constitutional:  Negative for fatigue and fever. HENT:  Negative for sore throat and trouble swallowing.     Eyes:  Negative for photophobia and visual disturbance. Respiratory:  Negative for cough and shortness of breath. Cardiovascular:  Negative for chest pain and palpitations. Gastrointestinal:  Negative for abdominal pain and diarrhea. Genitourinary:  Negative for dysuria and flank pain. Musculoskeletal:  Positive for back pain. Negative for neck pain and neck stiffness. Skin:  Negative for pallor and rash. Neurological:  Negative for weakness and numbness. Past Medical History:   Diagnosis Date    Abscess     GERD (gastroesophageal reflux disease)     Hypertension      History reviewed. No pertinent surgical history. Family History   Problem Relation Age of Onset    Cancer Mother     Diabetes Father     Hypertension Father      No Known Allergies    Vital Signs:  Patient Vitals for the past 12 hrs:   Temp Pulse Resp BP SpO2   11/09/22 0601 97.7 °F (36.5 °C) 90 18 (!) 157/105 100 %       Physical Exam:    Physical Exam  Constitutional:       General: He is not in acute distress. Appearance: Normal appearance. He is not ill-appearing. HENT:      Head: Normocephalic and atraumatic. Right Ear: External ear normal.      Left Ear: External ear normal.      Nose: No congestion. Mouth/Throat:      Pharynx: No oropharyngeal exudate or posterior oropharyngeal erythema. Eyes:      General:         Right eye: No discharge. Left eye: No discharge. Cardiovascular:      Heart sounds: No murmur heard. No friction rub. Pulmonary:      Effort: No respiratory distress. Breath sounds: No stridor. Abdominal:      General: There is no distension. Palpations: There is no mass. Musculoskeletal:         General: Tenderness (+ TTP over the ischial tuberosity on the right - reproduction of symptoms. ) present. No swelling. Cervical back: No rigidity or tenderness. Skin:     Coloration: Skin is not jaundiced or pale. Neurological:      General: No focal deficit present.       Mental Status: He is alert and oriented to person, place, and time. GCS: GCS eye subscore is 4. GCS verbal subscore is 5. GCS motor subscore is 6. Cranial Nerves: No cranial nerve deficit, dysarthria or facial asymmetry. Sensory: No sensory deficit. Motor: No weakness. Coordination: Coordination normal.      Deep Tendon Reflexes:      Reflex Scores:       Patellar reflexes are 2+ on the right side and 2+ on the left side. Documentation Review:  Nursing notes, previous records and radiologic studies as available via chart review    Diagnostics:  Labs -  No results found for this or any previous visit (from the past 24 hour(s)).       Radiologic Studies -   No orders to display       Broderick Romero MD

## 2025-02-24 ENCOUNTER — APPOINTMENT (OUTPATIENT)
Facility: HOSPITAL | Age: 50
End: 2025-02-24
Payer: OTHER GOVERNMENT

## 2025-02-24 ENCOUNTER — HOSPITAL ENCOUNTER (EMERGENCY)
Facility: HOSPITAL | Age: 50
Discharge: HOME | End: 2025-02-24
Attending: EMERGENCY MEDICINE
Payer: OTHER GOVERNMENT

## 2025-02-24 VITALS
HEIGHT: 68 IN | BODY MASS INDEX: 31.83 KG/M2 | SYSTOLIC BLOOD PRESSURE: 129 MMHG | HEART RATE: 78 BPM | DIASTOLIC BLOOD PRESSURE: 76 MMHG | TEMPERATURE: 97.6 F | WEIGHT: 210 LBS | RESPIRATION RATE: 14 BRPM | OXYGEN SATURATION: 99 %

## 2025-02-24 DIAGNOSIS — R10.84 GENERALIZED ABDOMINAL PAIN: Primary | ICD-10-CM

## 2025-02-24 LAB
ALBUMIN SERPL-MCNC: 4.2 G/DL (ref 3.4–5)
ALBUMIN/GLOB SERPL: 1.1 (ref 0.8–1.7)
ALP SERPL-CCNC: 76 U/L (ref 45–117)
ALT SERPL-CCNC: 19 U/L (ref 16–61)
ANION GAP SERPL CALC-SCNC: 9 MMOL/L (ref 3–18)
APPEARANCE UR: CLEAR
AST SERPL-CCNC: 10 U/L (ref 10–38)
BACTERIA URNS QL MICRO: NEGATIVE /HPF
BASOPHILS # BLD: 0.03 K/UL (ref 0–0.1)
BASOPHILS NFR BLD: 0.2 % (ref 0–2)
BILIRUB SERPL-MCNC: 0.6 MG/DL (ref 0.2–1)
BILIRUB UR QL: NEGATIVE
BUN SERPL-MCNC: 11 MG/DL (ref 7–18)
BUN/CREAT SERPL: 10 (ref 12–20)
CALCIUM SERPL-MCNC: 9.5 MG/DL (ref 8.5–10.1)
CHLORIDE SERPL-SCNC: 101 MMOL/L (ref 100–111)
CO2 SERPL-SCNC: 25 MMOL/L (ref 21–32)
COLOR UR: YELLOW
CREAT SERPL-MCNC: 1.1 MG/DL (ref 0.6–1.3)
DIFFERENTIAL METHOD BLD: ABNORMAL
EOSINOPHIL # BLD: 0.04 K/UL (ref 0–0.4)
EOSINOPHIL NFR BLD: 0.3 % (ref 0–5)
EPITH CASTS URNS QL MICRO: ABNORMAL /LPF (ref 0–5)
ERYTHROCYTE [DISTWIDTH] IN BLOOD BY AUTOMATED COUNT: 13.4 % (ref 11.6–14.5)
GLOBULIN SER CALC-MCNC: 3.9 G/DL (ref 2–4)
GLUCOSE SERPL-MCNC: 196 MG/DL (ref 74–99)
GLUCOSE UR STRIP.AUTO-MCNC: NEGATIVE MG/DL
HCT VFR BLD AUTO: 38.7 % (ref 36–48)
HGB BLD-MCNC: 13.4 G/DL (ref 13–16)
HGB UR QL STRIP: NEGATIVE
IMM GRANULOCYTES # BLD AUTO: 0.03 K/UL (ref 0–0.04)
IMM GRANULOCYTES NFR BLD AUTO: 0.2 % (ref 0–0.5)
KETONES UR QL STRIP.AUTO: 15 MG/DL
LEUKOCYTE ESTERASE UR QL STRIP.AUTO: ABNORMAL
LIPASE SERPL-CCNC: 48 U/L (ref 13–75)
LYMPHOCYTES # BLD: 2 K/UL (ref 0.9–3.6)
LYMPHOCYTES NFR BLD: 14 % (ref 21–52)
MCH RBC QN AUTO: 31 PG (ref 24–34)
MCHC RBC AUTO-ENTMCNC: 34.6 G/DL (ref 31–37)
MCV RBC AUTO: 89.6 FL (ref 78–100)
MONOCYTES # BLD: 0.64 K/UL (ref 0.05–1.2)
MONOCYTES NFR BLD: 4.5 % (ref 3–10)
MUCOUS THREADS URNS QL MICRO: ABNORMAL /LPF
NEUTS SEG # BLD: 11.53 K/UL (ref 1.8–8)
NEUTS SEG NFR BLD: 80.8 % (ref 40–73)
NITRITE UR QL STRIP.AUTO: NEGATIVE
NRBC # BLD: 0 K/UL (ref 0–0.01)
NRBC BLD-RTO: 0 PER 100 WBC
PH UR STRIP: 6.5 (ref 5–8)
PLATELET # BLD AUTO: 380 K/UL (ref 135–420)
PMV BLD AUTO: 9.3 FL (ref 9.2–11.8)
POTASSIUM SERPL-SCNC: 3.3 MMOL/L (ref 3.5–5.5)
PROT SERPL-MCNC: 8.1 G/DL (ref 6.4–8.2)
PROT UR STRIP-MCNC: 100 MG/DL
RBC # BLD AUTO: 4.32 M/UL (ref 4.35–5.65)
RBC #/AREA URNS HPF: NEGATIVE /HPF (ref 0–5)
SODIUM SERPL-SCNC: 135 MMOL/L (ref 136–145)
SP GR UR REFRACTOMETRY: 1.02 (ref 1–1.03)
UROBILINOGEN UR QL STRIP.AUTO: 1 EU/DL (ref 0.2–1)
WBC # BLD AUTO: 14.3 K/UL (ref 4.6–13.2)
WBC URNS QL MICRO: ABNORMAL /HPF (ref 0–4)

## 2025-02-24 PROCEDURE — 6370000000 HC RX 637 (ALT 250 FOR IP): Performed by: EMERGENCY MEDICINE

## 2025-02-24 PROCEDURE — 6360000002 HC RX W HCPCS: Performed by: EMERGENCY MEDICINE

## 2025-02-24 PROCEDURE — 99285 EMERGENCY DEPT VISIT HI MDM: CPT

## 2025-02-24 PROCEDURE — 80053 COMPREHEN METABOLIC PANEL: CPT

## 2025-02-24 PROCEDURE — 6360000004 HC RX CONTRAST MEDICATION: Performed by: EMERGENCY MEDICINE

## 2025-02-24 PROCEDURE — 96375 TX/PRO/DX INJ NEW DRUG ADDON: CPT

## 2025-02-24 PROCEDURE — 2580000003 HC RX 258: Performed by: EMERGENCY MEDICINE

## 2025-02-24 PROCEDURE — 2500000003 HC RX 250 WO HCPCS: Performed by: EMERGENCY MEDICINE

## 2025-02-24 PROCEDURE — 83690 ASSAY OF LIPASE: CPT

## 2025-02-24 PROCEDURE — 81001 URINALYSIS AUTO W/SCOPE: CPT

## 2025-02-24 PROCEDURE — 85025 COMPLETE CBC W/AUTO DIFF WBC: CPT

## 2025-02-24 PROCEDURE — 96374 THER/PROPH/DIAG INJ IV PUSH: CPT

## 2025-02-24 PROCEDURE — 74177 CT ABD & PELVIS W/CONTRAST: CPT

## 2025-02-24 RX ORDER — IOPAMIDOL 612 MG/ML
100 INJECTION, SOLUTION INTRAVASCULAR
Status: COMPLETED | OUTPATIENT
Start: 2025-02-24 | End: 2025-02-24

## 2025-02-24 RX ORDER — MORPHINE SULFATE 4 MG/ML
4 INJECTION, SOLUTION INTRAMUSCULAR; INTRAVENOUS AS NEEDED
Status: COMPLETED | OUTPATIENT
Start: 2025-02-24 | End: 2025-02-24

## 2025-02-24 RX ADMIN — MORPHINE SULFATE 4 MG: 4 INJECTION, SOLUTION INTRAMUSCULAR; INTRAVENOUS at 09:11

## 2025-02-24 RX ADMIN — LIDOCAINE HYDROCHLORIDE 40 ML: 20 SOLUTION ORAL at 08:05

## 2025-02-24 RX ADMIN — FAMOTIDINE 20 MG: 10 INJECTION, SOLUTION INTRAVENOUS at 08:04

## 2025-02-24 RX ADMIN — IOPAMIDOL 100 ML: 612 INJECTION, SOLUTION INTRAVENOUS at 09:34

## 2025-02-24 ASSESSMENT — LIFESTYLE VARIABLES
HOW MANY STANDARD DRINKS CONTAINING ALCOHOL DO YOU HAVE ON A TYPICAL DAY: 1 OR 2
HOW OFTEN DO YOU HAVE A DRINK CONTAINING ALCOHOL: MONTHLY OR LESS

## 2025-02-24 ASSESSMENT — PAIN SCALES - GENERAL
PAINLEVEL_OUTOF10: 3
PAINLEVEL_OUTOF10: 10
PAINLEVEL_OUTOF10: 8
PAINLEVEL_OUTOF10: 0

## 2025-02-24 ASSESSMENT — PAIN DESCRIPTION - DESCRIPTORS: DESCRIPTORS: THROBBING

## 2025-02-24 ASSESSMENT — PAIN - FUNCTIONAL ASSESSMENT
PAIN_FUNCTIONAL_ASSESSMENT: 0-10
PAIN_FUNCTIONAL_ASSESSMENT: INTOLERABLE, UNABLE TO DO ANY ACTIVE OR PASSIVE ACTIVITIES
PAIN_FUNCTIONAL_ASSESSMENT: 0-10

## 2025-02-24 ASSESSMENT — PAIN DESCRIPTION - PAIN TYPE: TYPE: ACUTE PAIN

## 2025-02-24 ASSESSMENT — PAIN DESCRIPTION - LOCATION: LOCATION: ABDOMEN

## 2025-02-24 ASSESSMENT — PAIN DESCRIPTION - ONSET: ONSET: GRADUAL

## 2025-02-24 ASSESSMENT — PAIN DESCRIPTION - FREQUENCY: FREQUENCY: CONTINUOUS

## 2025-02-24 NOTE — ED PROVIDER NOTES
Chief Complaint  Severe stomach pain since 5:30 this morning  History of Present Illness (HPI)  Lobo Brown is a 50-year-old male with a history of diabetes and high blood pressure. He presents with severe stomach pain that started at 5:30 AM today. The pain is described as severe and throbbing, located at the top of his stomach. He has vomited three times, with the vomit being clear. He denies diarrhea, chest pain, coughing, back pain, or fever. The patient reports that he occasionally experiences stomach pain related to his diabetes and Ozempic use, but this episode is more severe and persistent than usual. He states, 'This didn't go away at all,' and 'This has been consistent since 5:30 a.m.' The patient's wife brought him to the hospital, and this is his first time seeking medical attention for this issue.  Surgical History  Not provided  Medical History  Diabetes  High blood pressure  Social History  The patient smokes at home. His wife brought him to the hospital for this visit.  Family History  Not provided  Allergies  Patient has allergies, but specific allergens are not provided in the transcript.  Medications  Ozempic - for diabetes (dosage and frequency not provided)  Review of Systems (ROS)  Constitutional: Patient reports feeling unwell  Gastrointestinal: Positive for severe stomach pain and vomiting. Denies diarrhea.  Cardiovascular: Denies chest pain.  Respiratory: Denies coughing.  Musculoskeletal: Denies back pain.  General: Denies fever.  Physical Examination  General Appearance: Patient looks very uncomfortable.  Skin: No abnormal skin color noted.  Lungs: Clear bilaterally.  Abdomen: Epigastric tenderness noted. Pain elicited when pushing on the top of the stomach.  Vital signs not provided in the transcript.  Diagnostic Tests  No diagnostic tests mentioned in the transcript.  Assessment and Plan  Assessment:  50-year-old male with a history of diabetes and hypertension presenting with

## 2025-02-24 NOTE — ED TRIAGE NOTES
Patient arrived to ER with complaints of upper abdominal pain, started this morning 0530. Denies diarrhea, coughing, or chest pain. Pt is diabetic and uses ozempic, has nausea but usually is not this bad.

## 2025-03-10 ENCOUNTER — HOSPITAL ENCOUNTER (EMERGENCY)
Facility: HOSPITAL | Age: 50
Discharge: HOME | End: 2025-03-10
Payer: OTHER GOVERNMENT

## 2025-03-10 ENCOUNTER — APPOINTMENT (OUTPATIENT)
Facility: HOSPITAL | Age: 50
End: 2025-03-10
Payer: OTHER GOVERNMENT

## 2025-03-10 VITALS
WEIGHT: 200 LBS | SYSTOLIC BLOOD PRESSURE: 122 MMHG | TEMPERATURE: 97 F | HEIGHT: 68 IN | OXYGEN SATURATION: 98 % | HEART RATE: 96 BPM | RESPIRATION RATE: 16 BRPM | BODY MASS INDEX: 30.31 KG/M2 | DIASTOLIC BLOOD PRESSURE: 58 MMHG

## 2025-03-10 DIAGNOSIS — K52.9 ENTERITIS: ICD-10-CM

## 2025-03-10 DIAGNOSIS — R11.2 NAUSEA AND VOMITING, UNSPECIFIED VOMITING TYPE: Primary | ICD-10-CM

## 2025-03-10 DIAGNOSIS — E87.6 HYPOKALEMIA: ICD-10-CM

## 2025-03-10 LAB
ALBUMIN SERPL-MCNC: 4.2 G/DL (ref 3.4–5)
ALBUMIN/GLOB SERPL: 1.2 (ref 0.8–1.7)
ALP SERPL-CCNC: 64 U/L (ref 45–117)
ALT SERPL-CCNC: 18 U/L (ref 16–61)
AMPHET UR QL SCN: NEGATIVE
ANION GAP SERPL CALC-SCNC: 11 MMOL/L (ref 3–18)
APPEARANCE UR: CLEAR
AST SERPL-CCNC: 10 U/L (ref 10–38)
BACTERIA URNS QL MICRO: NEGATIVE /HPF
BARBITURATES UR QL SCN: NEGATIVE
BASOPHILS # BLD: 0.03 K/UL (ref 0–0.1)
BASOPHILS NFR BLD: 0.2 % (ref 0–2)
BENZODIAZ UR QL: NEGATIVE
BILIRUB SERPL-MCNC: 0.4 MG/DL (ref 0.2–1)
BILIRUB UR QL: NEGATIVE
BUN SERPL-MCNC: 13 MG/DL (ref 7–18)
BUN/CREAT SERPL: 11 (ref 12–20)
CALCIUM SERPL-MCNC: 9.5 MG/DL (ref 8.5–10.1)
CANNABINOIDS UR QL SCN: POSITIVE
CHLORIDE SERPL-SCNC: 104 MMOL/L (ref 100–111)
CO2 SERPL-SCNC: 23 MMOL/L (ref 21–32)
COCAINE UR QL SCN: NEGATIVE
COLOR UR: YELLOW
CREAT SERPL-MCNC: 1.14 MG/DL (ref 0.6–1.3)
DIFFERENTIAL METHOD BLD: ABNORMAL
EKG ATRIAL RATE: 96 BPM
EKG DIAGNOSIS: NORMAL
EKG P AXIS: 54 DEGREES
EKG P-R INTERVAL: 160 MS
EKG Q-T INTERVAL: 384 MS
EKG QRS DURATION: 88 MS
EKG QTC CALCULATION (BAZETT): 485 MS
EKG R AXIS: 17 DEGREES
EKG T AXIS: 44 DEGREES
EKG VENTRICULAR RATE: 96 BPM
EOSINOPHIL # BLD: 0.03 K/UL (ref 0–0.4)
EOSINOPHIL NFR BLD: 0.2 % (ref 0–5)
EPITH CASTS URNS QL MICRO: ABNORMAL /LPF (ref 0–5)
ERYTHROCYTE [DISTWIDTH] IN BLOOD BY AUTOMATED COUNT: 13.1 % (ref 11.6–14.5)
GLOBULIN SER CALC-MCNC: 3.4 G/DL (ref 2–4)
GLUCOSE SERPL-MCNC: 118 MG/DL (ref 74–99)
GLUCOSE UR STRIP.AUTO-MCNC: NEGATIVE MG/DL
HCT VFR BLD AUTO: 36.4 % (ref 36–48)
HGB BLD-MCNC: 12.6 G/DL (ref 13–16)
HGB UR QL STRIP: NEGATIVE
IMM GRANULOCYTES # BLD AUTO: 0.06 K/UL (ref 0–0.04)
IMM GRANULOCYTES NFR BLD AUTO: 0.4 % (ref 0–0.5)
KETONES UR QL STRIP.AUTO: 40 MG/DL
LEUKOCYTE ESTERASE UR QL STRIP.AUTO: NEGATIVE
LIPASE SERPL-CCNC: 53 U/L (ref 13–75)
LYMPHOCYTES # BLD: 2.78 K/UL (ref 0.9–3.6)
LYMPHOCYTES NFR BLD: 17.1 % (ref 21–52)
Lab: ABNORMAL
MAGNESIUM SERPL-MCNC: 1.6 MG/DL (ref 1.6–2.6)
MCH RBC QN AUTO: 31 PG (ref 24–34)
MCHC RBC AUTO-ENTMCNC: 34.6 G/DL (ref 31–37)
MCV RBC AUTO: 89.7 FL (ref 78–100)
METHADONE UR QL: NEGATIVE
MONOCYTES # BLD: 0.78 K/UL (ref 0.05–1.2)
MONOCYTES NFR BLD: 4.8 % (ref 3–10)
MUCOUS THREADS URNS QL MICRO: ABNORMAL /LPF
NEUTS SEG # BLD: 12.57 K/UL (ref 1.8–8)
NEUTS SEG NFR BLD: 77.3 % (ref 40–73)
NITRITE UR QL STRIP.AUTO: NEGATIVE
NRBC # BLD: 0 K/UL (ref 0–0.01)
NRBC BLD-RTO: 0 PER 100 WBC
OPIATES UR QL: POSITIVE
PCP UR QL: NEGATIVE
PH UR STRIP: 6.5 (ref 5–8)
PLATELET # BLD AUTO: 357 K/UL (ref 135–420)
PMV BLD AUTO: 9.5 FL (ref 9.2–11.8)
POTASSIUM SERPL-SCNC: 3 MMOL/L (ref 3.5–5.5)
PROT SERPL-MCNC: 7.6 G/DL (ref 6.4–8.2)
PROT UR STRIP-MCNC: ABNORMAL MG/DL
RBC # BLD AUTO: 4.06 M/UL (ref 4.35–5.65)
RBC #/AREA URNS HPF: NEGATIVE /HPF (ref 0–5)
SODIUM SERPL-SCNC: 138 MMOL/L (ref 136–145)
SP GR UR REFRACTOMETRY: >1.03 (ref 1–1.03)
TROPONIN I SERPL HS-MCNC: 4 NG/L (ref 0–78)
TROPONIN I SERPL HS-MCNC: 6 NG/L (ref 0–78)
UROBILINOGEN UR QL STRIP.AUTO: 1 EU/DL (ref 0.2–1)
WBC # BLD AUTO: 16.3 K/UL (ref 4.6–13.2)
WBC URNS QL MICRO: ABNORMAL /HPF (ref 0–4)

## 2025-03-10 PROCEDURE — 96374 THER/PROPH/DIAG INJ IV PUSH: CPT

## 2025-03-10 PROCEDURE — 84484 ASSAY OF TROPONIN QUANT: CPT

## 2025-03-10 PROCEDURE — 99285 EMERGENCY DEPT VISIT HI MDM: CPT

## 2025-03-10 PROCEDURE — 93005 ELECTROCARDIOGRAM TRACING: CPT | Performed by: EMERGENCY MEDICINE

## 2025-03-10 PROCEDURE — 83735 ASSAY OF MAGNESIUM: CPT

## 2025-03-10 PROCEDURE — 80053 COMPREHEN METABOLIC PANEL: CPT

## 2025-03-10 PROCEDURE — 96376 TX/PRO/DX INJ SAME DRUG ADON: CPT

## 2025-03-10 PROCEDURE — 96375 TX/PRO/DX INJ NEW DRUG ADDON: CPT

## 2025-03-10 PROCEDURE — 6360000004 HC RX CONTRAST MEDICATION: Performed by: EMERGENCY MEDICINE

## 2025-03-10 PROCEDURE — 85025 COMPLETE CBC W/AUTO DIFF WBC: CPT

## 2025-03-10 PROCEDURE — 96361 HYDRATE IV INFUSION ADD-ON: CPT

## 2025-03-10 PROCEDURE — 2580000003 HC RX 258: Performed by: EMERGENCY MEDICINE

## 2025-03-10 PROCEDURE — 80307 DRUG TEST PRSMV CHEM ANLYZR: CPT

## 2025-03-10 PROCEDURE — 74177 CT ABD & PELVIS W/CONTRAST: CPT

## 2025-03-10 PROCEDURE — 83690 ASSAY OF LIPASE: CPT

## 2025-03-10 PROCEDURE — 6360000002 HC RX W HCPCS: Performed by: EMERGENCY MEDICINE

## 2025-03-10 PROCEDURE — 81001 URINALYSIS AUTO W/SCOPE: CPT

## 2025-03-10 RX ORDER — ONDANSETRON 2 MG/ML
4 INJECTION INTRAMUSCULAR; INTRAVENOUS
Status: COMPLETED | OUTPATIENT
Start: 2025-03-10 | End: 2025-03-10

## 2025-03-10 RX ORDER — METOCLOPRAMIDE 10 MG/1
10 TABLET ORAL 4 TIMES DAILY
Qty: 20 TABLET | Refills: 0 | Status: SHIPPED | OUTPATIENT
Start: 2025-03-10

## 2025-03-10 RX ORDER — MORPHINE SULFATE 4 MG/ML
4 INJECTION, SOLUTION INTRAMUSCULAR; INTRAVENOUS
Status: COMPLETED | OUTPATIENT
Start: 2025-03-10 | End: 2025-03-10

## 2025-03-10 RX ORDER — METOCLOPRAMIDE HYDROCHLORIDE 5 MG/ML
10 INJECTION INTRAMUSCULAR; INTRAVENOUS
Status: COMPLETED | OUTPATIENT
Start: 2025-03-10 | End: 2025-03-10

## 2025-03-10 RX ORDER — POTASSIUM CHLORIDE 1500 MG/1
20 TABLET, EXTENDED RELEASE ORAL 2 TIMES DAILY
Qty: 10 TABLET | Refills: 0 | Status: SHIPPED | OUTPATIENT
Start: 2025-03-10 | End: 2025-03-15

## 2025-03-10 RX ORDER — DICYCLOMINE HYDROCHLORIDE 10 MG/1
10 CAPSULE ORAL
Qty: 40 CAPSULE | Refills: 0 | Status: SHIPPED | OUTPATIENT
Start: 2025-03-10 | End: 2025-03-20

## 2025-03-10 RX ORDER — ONDANSETRON 2 MG/ML
4 INJECTION INTRAMUSCULAR; INTRAVENOUS
Status: DISCONTINUED | OUTPATIENT
Start: 2025-03-10 | End: 2025-03-10

## 2025-03-10 RX ORDER — ONDANSETRON 8 MG/1
8 TABLET, ORALLY DISINTEGRATING ORAL 3 TIMES DAILY PRN
Qty: 12 TABLET | Refills: 1 | Status: SHIPPED | OUTPATIENT
Start: 2025-03-10

## 2025-03-10 RX ORDER — HYDROMORPHONE HYDROCHLORIDE 1 MG/ML
1 INJECTION, SOLUTION INTRAMUSCULAR; INTRAVENOUS; SUBCUTANEOUS
Status: COMPLETED | OUTPATIENT
Start: 2025-03-10 | End: 2025-03-10

## 2025-03-10 RX ORDER — 0.9 % SODIUM CHLORIDE 0.9 %
1000 INTRAVENOUS SOLUTION INTRAVENOUS ONCE
Status: COMPLETED | OUTPATIENT
Start: 2025-03-10 | End: 2025-03-10

## 2025-03-10 RX ORDER — IOPAMIDOL 612 MG/ML
100 INJECTION, SOLUTION INTRAVASCULAR
Status: COMPLETED | OUTPATIENT
Start: 2025-03-10 | End: 2025-03-10

## 2025-03-10 RX ORDER — PANTOPRAZOLE SODIUM 40 MG/1
40 TABLET, DELAYED RELEASE ORAL DAILY
Qty: 40 TABLET | Refills: 1 | Status: SHIPPED | OUTPATIENT
Start: 2025-03-10

## 2025-03-10 RX ADMIN — METOCLOPRAMIDE HYDROCHLORIDE 10 MG: 5 INJECTION INTRAMUSCULAR; INTRAVENOUS at 14:44

## 2025-03-10 RX ADMIN — ONDANSETRON 4 MG: 2 INJECTION, SOLUTION INTRAMUSCULAR; INTRAVENOUS at 15:40

## 2025-03-10 RX ADMIN — IOPAMIDOL 100 ML: 612 INJECTION, SOLUTION INTRAVENOUS at 15:32

## 2025-03-10 RX ADMIN — MORPHINE SULFATE 4 MG: 4 INJECTION, SOLUTION INTRAMUSCULAR; INTRAVENOUS at 14:45

## 2025-03-10 RX ADMIN — SODIUM CHLORIDE 1000 ML: 9 INJECTION, SOLUTION INTRAVENOUS at 14:43

## 2025-03-10 RX ADMIN — MORPHINE SULFATE 4 MG: 4 INJECTION, SOLUTION INTRAMUSCULAR; INTRAVENOUS at 16:20

## 2025-03-10 RX ADMIN — HYDROMORPHONE HYDROCHLORIDE 1 MG: 1 INJECTION, SOLUTION INTRAMUSCULAR; INTRAVENOUS; SUBCUTANEOUS at 16:58

## 2025-03-10 RX ADMIN — PANTOPRAZOLE SODIUM 40 MG: 40 INJECTION, POWDER, FOR SOLUTION INTRAVENOUS at 14:44

## 2025-03-10 ASSESSMENT — PAIN SCALES - GENERAL
PAINLEVEL_OUTOF10: 10
PAINLEVEL_OUTOF10: 2
PAINLEVEL_OUTOF10: 10

## 2025-03-10 ASSESSMENT — LIFESTYLE VARIABLES
HOW MANY STANDARD DRINKS CONTAINING ALCOHOL DO YOU HAVE ON A TYPICAL DAY: PATIENT DOES NOT DRINK
HOW OFTEN DO YOU HAVE A DRINK CONTAINING ALCOHOL: NEVER

## 2025-03-10 ASSESSMENT — PAIN - FUNCTIONAL ASSESSMENT: PAIN_FUNCTIONAL_ASSESSMENT: 0-10

## 2025-03-10 NOTE — ED PROVIDER NOTES
EMERGENCY DEPARTMENT HISTORY AND PHYSICAL EXAM      Date: 3/10/2025  Patient Name: Lobo Yin    History of Presenting Illness     Chief Complaint   Patient presents with    Abdominal Pain    Vomiting    Nausea       History (Context): Lobo Yin is a 50 y.o. male  presents to the ED today with chief complaint of upper abdominal pain nausea vomiting today after eating chips.  He vomited 3 times in succession denies ynes hematemesis melena hematochezia diarrheal stools prior abdominal surgeries.  Does have a history of reflux; is not actively followed by GI and he cannot remember who told him he had this as a diagnosis.  Smokes marijuana and tobacco.  Denies alcohol and opioids and cocaine.      PCP: No primary care provider on file.    Current Facility-Administered Medications   Medication Dose Route Frequency Provider Last Rate Last Admin    pantoprazole (PROTONIX) 40 mg in sodium chloride (PF) 0.9 % 10 mL injection  40 mg IntraVENous NOW Sandi Ramirez PA        sodium chloride 0.9 % bolus 1,000 mL  1,000 mL IntraVENous Once Sandi Ramirez PA        morphine sulfate (PF) injection 4 mg  4 mg IntraVENous NOW Sandi Ramirez PA        metoclopramide (REGLAN) injection 10 mg  10 mg IntraVENous NOW Sandi Ramirez PA         Current Outpatient Medications   Medication Sig Dispense Refill    amLODIPine (NORVASC) 10 MG tablet TAKE 1 TAB BY MOUTH DAILY.      metoprolol succinate (TOPROL XL) 100 MG extended release tablet TAKE 1 TAB BY MOUTH DAILY.      naproxen (NAPROSYN) 375 MG tablet Take 375 mg by mouth 2 times daily (with meals)         Past History     Past Medical History:   Past Medical History:   Diagnosis Date    Abscess     GERD (gastroesophageal reflux disease)     Hypertension        Past Surgical History:  History reviewed. No pertinent surgical history.    Family History:  Family History   Problem Relation Age of Onset    Diabetes Father     Cancer Mother     Hypertension Father        Social History:

## 2025-03-10 NOTE — ED TRIAGE NOTES
Patient ambulated to room with steady gait. Complains of abdominal pain, cramping, starting 1300, patient ate some chips and vomited at 1345, rating 10/10, nausea, vomiting.    Denies fever, diarrhea

## 2025-05-13 ENCOUNTER — APPOINTMENT (OUTPATIENT)
Age: 50
End: 2025-05-13
Payer: OTHER GOVERNMENT

## 2025-05-13 ENCOUNTER — HOSPITAL ENCOUNTER (EMERGENCY)
Age: 50
Discharge: HOME | End: 2025-05-13
Payer: OTHER GOVERNMENT

## 2025-05-13 VITALS
BODY MASS INDEX: 28.79 KG/M2 | RESPIRATION RATE: 23 BRPM | OXYGEN SATURATION: 100 % | HEART RATE: 97 BPM | DIASTOLIC BLOOD PRESSURE: 88 MMHG | HEIGHT: 68 IN | SYSTOLIC BLOOD PRESSURE: 139 MMHG | TEMPERATURE: 97.5 F | WEIGHT: 190 LBS

## 2025-05-13 DIAGNOSIS — R11.2 NAUSEA AND VOMITING, UNSPECIFIED VOMITING TYPE: ICD-10-CM

## 2025-05-13 DIAGNOSIS — R10.84 GENERALIZED ABDOMINAL PAIN: Primary | ICD-10-CM

## 2025-05-13 LAB
ALBUMIN SERPL-MCNC: 4.3 G/DL (ref 3.4–5)
ALBUMIN/GLOB SERPL: 1.3 (ref 1–1.9)
ALP SERPL-CCNC: 67 U/L (ref 45–117)
ALT SERPL-CCNC: 14 U/L (ref 10–50)
ANION GAP SERPL CALC-SCNC: 24 MMOL/L (ref 7–16)
AST SERPL-CCNC: 21 U/L (ref 10–38)
BASOPHILS # BLD: 0.02 K/UL (ref 0–0.1)
BASOPHILS NFR BLD: 0.2 % (ref 0–2)
BILIRUB SERPL-MCNC: 0.5 MG/DL (ref 0.2–1)
BUN SERPL-MCNC: 11 MG/DL (ref 6–23)
BUN/CREAT SERPL: 11
CALCIUM SERPL-MCNC: 9.7 MG/DL (ref 8.5–10.1)
CHLORIDE SERPL-SCNC: 97 MMOL/L (ref 98–107)
CO2 SERPL-SCNC: 17 MMOL/L (ref 21–32)
CREAT SERPL-MCNC: 0.96 MG/DL (ref 0.6–1.3)
DIFFERENTIAL METHOD BLD: ABNORMAL
EOSINOPHIL # BLD: 0.01 K/UL (ref 0–0.4)
EOSINOPHIL NFR BLD: 0.1 % (ref 0–5)
ERYTHROCYTE [DISTWIDTH] IN BLOOD BY AUTOMATED COUNT: 12.8 % (ref 11.6–14.5)
GLOBULIN SER CALC-MCNC: 3.3 G/DL (ref 2.3–3.5)
GLUCOSE SERPL-MCNC: 252 MG/DL (ref 74–108)
HCT VFR BLD AUTO: 33.5 % (ref 36–48)
HGB BLD-MCNC: 11.8 G/DL (ref 13–16)
IMM GRANULOCYTES # BLD AUTO: 0.04 K/UL (ref 0–0.04)
IMM GRANULOCYTES NFR BLD AUTO: 0.3 % (ref 0–0.5)
LIPASE SERPL-CCNC: 31 U/L (ref 13–75)
LYMPHOCYTES # BLD: 1 K/UL (ref 0.9–3.6)
LYMPHOCYTES NFR BLD: 8.4 % (ref 21–52)
MCH RBC QN AUTO: 30.9 PG (ref 24–34)
MCHC RBC AUTO-ENTMCNC: 35.2 G/DL (ref 31–37)
MCV RBC AUTO: 87.7 FL (ref 78–100)
MONOCYTES # BLD: 0.27 K/UL (ref 0.05–1.2)
MONOCYTES NFR BLD: 2.3 % (ref 3–10)
NEUTS SEG # BLD: 10.5 K/UL (ref 1.8–8)
NEUTS SEG NFR BLD: 88.7 % (ref 40–73)
NRBC # BLD: 0 K/UL (ref 0–0.01)
NRBC BLD-RTO: 0 PER 100 WBC
PH BLDV: 7.44 (ref 7.32–7.42)
PLATELET # BLD AUTO: 385 K/UL (ref 135–420)
PMV BLD AUTO: 9 FL (ref 9.2–11.8)
POTASSIUM SERPL-SCNC: 3.7 MMOL/L (ref 3.5–5.5)
PROT SERPL-MCNC: 7.6 G/DL (ref 6.4–8.2)
RBC # BLD AUTO: 3.82 M/UL (ref 4.35–5.65)
SERVICE CMNT-IMP: ABNORMAL
SODIUM SERPL-SCNC: 138 MMOL/L (ref 136–145)
SPECIMEN TYPE: ABNORMAL
WBC # BLD AUTO: 11.8 K/UL (ref 4.6–13.2)

## 2025-05-13 PROCEDURE — 74176 CT ABD & PELVIS W/O CONTRAST: CPT

## 2025-05-13 PROCEDURE — 96361 HYDRATE IV INFUSION ADD-ON: CPT

## 2025-05-13 PROCEDURE — 83690 ASSAY OF LIPASE: CPT

## 2025-05-13 PROCEDURE — 96375 TX/PRO/DX INJ NEW DRUG ADDON: CPT

## 2025-05-13 PROCEDURE — 2580000003 HC RX 258

## 2025-05-13 PROCEDURE — 81003 URINALYSIS AUTO W/O SCOPE: CPT

## 2025-05-13 PROCEDURE — 80053 COMPREHEN METABOLIC PANEL: CPT

## 2025-05-13 PROCEDURE — 6360000002 HC RX W HCPCS

## 2025-05-13 PROCEDURE — 96374 THER/PROPH/DIAG INJ IV PUSH: CPT

## 2025-05-13 PROCEDURE — 99284 EMERGENCY DEPT VISIT MOD MDM: CPT

## 2025-05-13 PROCEDURE — 85025 COMPLETE CBC W/AUTO DIFF WBC: CPT

## 2025-05-13 PROCEDURE — 81001 URINALYSIS AUTO W/SCOPE: CPT

## 2025-05-13 RX ORDER — ONDANSETRON 2 MG/ML
4 INJECTION INTRAMUSCULAR; INTRAVENOUS
Status: COMPLETED | OUTPATIENT
Start: 2025-05-13 | End: 2025-05-13

## 2025-05-13 RX ORDER — 0.9 % SODIUM CHLORIDE 0.9 %
1000 INTRAVENOUS SOLUTION INTRAVENOUS ONCE
Status: COMPLETED | OUTPATIENT
Start: 2025-05-13 | End: 2025-05-13

## 2025-05-13 RX ORDER — MORPHINE SULFATE 4 MG/ML
4 INJECTION, SOLUTION INTRAMUSCULAR; INTRAVENOUS
Status: COMPLETED | OUTPATIENT
Start: 2025-05-13 | End: 2025-05-13

## 2025-05-13 RX ORDER — DROPERIDOL 2.5 MG/ML
1.25 INJECTION, SOLUTION INTRAMUSCULAR; INTRAVENOUS ONCE
Status: COMPLETED | OUTPATIENT
Start: 2025-05-13 | End: 2025-05-13

## 2025-05-13 RX ORDER — SEMAGLUTIDE 1.34 MG/ML
1 INJECTION, SOLUTION SUBCUTANEOUS
COMMUNITY

## 2025-05-13 RX ORDER — 0.9 % SODIUM CHLORIDE 0.9 %
1000 INTRAVENOUS SOLUTION INTRAVENOUS ONCE
Status: DISCONTINUED | OUTPATIENT
Start: 2025-05-13 | End: 2025-05-13 | Stop reason: HOSPADM

## 2025-05-13 RX ADMIN — ONDANSETRON 4 MG: 2 INJECTION, SOLUTION INTRAMUSCULAR; INTRAVENOUS at 16:28

## 2025-05-13 RX ADMIN — SODIUM CHLORIDE 1000 ML: 0.9 INJECTION, SOLUTION INTRAVENOUS at 16:26

## 2025-05-13 RX ADMIN — MORPHINE SULFATE 4 MG: 4 INJECTION, SOLUTION INTRAMUSCULAR; INTRAVENOUS at 16:28

## 2025-05-13 RX ADMIN — DROPERIDOL 1.25 MG: 2.5 INJECTION, SOLUTION INTRAMUSCULAR; INTRAVENOUS at 17:42

## 2025-05-13 ASSESSMENT — ENCOUNTER SYMPTOMS
SHORTNESS OF BREATH: 0
VOMITING: 1
ABDOMINAL PAIN: 1
BACK PAIN: 0
COUGH: 0
NAUSEA: 1

## 2025-05-13 ASSESSMENT — PAIN SCALES - GENERAL: PAINLEVEL_OUTOF10: 10

## 2025-05-13 ASSESSMENT — PAIN - FUNCTIONAL ASSESSMENT: PAIN_FUNCTIONAL_ASSESSMENT: 0-10

## 2025-05-13 NOTE — ED PROVIDER NOTES
[facility] EMERGENCY DEPARTMENT  EMERGENCY DEPARTMENT ENCOUNTER      Pt Name: Lobo Yin  MRN: [id number]  Birthdate 1975  Date of evaluation: 5/13/2025  Provider: BENJAMIN Mcmullen  7:47 PM    CHIEF COMPLAINT       Chief Complaint   Patient presents with    Abdominal Pain         HISTORY OF PRESENT ILLNESS    Lobo Yin is a 50 y.o. male who presents to the emergency department with abdominal pain    50-year-old male with a history of diabetes, GERD and high blood pressure. He presents with severe stomach pain that started at today. The pain is described as severe and throbbing, located at the top of his stomach. He has vomited three times, with the vomit being clear. He denies diarrhea, chest pain, coughing, back pain, or fever.             Nursing Notes were reviewed.    REVIEW OF SYSTEMS       Review of Systems   Constitutional:  Negative for chills, fatigue and fever.   HENT:  Negative for congestion and ear pain.    Eyes:  Negative for visual disturbance.   Respiratory:  Negative for cough and shortness of breath.    Cardiovascular:  Negative for chest pain and palpitations.   Gastrointestinal:  Positive for abdominal pain, nausea and vomiting.   Genitourinary:  Negative for difficulty urinating, dysuria and flank pain.   Musculoskeletal:  Negative for back pain, myalgias, neck pain and neck stiffness.   Skin:  Negative for pallor and wound.   Neurological:  Negative for dizziness, seizures, syncope, weakness, light-headedness, numbness and headaches.   Hematological: Negative.    Psychiatric/Behavioral: Negative.         Except as noted above the remainder of the review of systems was reviewed and negative.       PAST MEDICAL HISTORY     Past Medical History:   Diagnosis Date    Abscess     GERD (gastroesophageal reflux disease)     Hypertension          SURGICAL HISTORY     History reviewed. No pertinent surgical history.      CURRENT MEDICATIONS       Previous Medications

## 2025-05-14 LAB
APPEARANCE UR: CLEAR
BACTERIA URNS QL MICRO: NEGATIVE /HPF
BILIRUB UR QL: NEGATIVE
COLOR UR: YELLOW
GLUCOSE UR STRIP.AUTO-MCNC: >1000 MG/DL
HGB UR QL STRIP: NEGATIVE
KETONES UR QL STRIP.AUTO: >160 MG/DL
LEUKOCYTE ESTERASE UR QL STRIP.AUTO: NEGATIVE
NITRITE UR QL STRIP.AUTO: NEGATIVE
PH UR STRIP: 7 (ref 5–8)
PROT UR STRIP-MCNC: 30 MG/DL
RBC #/AREA URNS HPF: NEGATIVE /HPF (ref 0–5)
SP GR UR REFRACTOMETRY: 1.02 (ref 1–1.03)
UROBILINOGEN UR QL STRIP.AUTO: 1 EU/DL (ref 0.2–1)
WBC URNS QL MICRO: NORMAL /HPF (ref 0–4)

## 2025-08-06 ENCOUNTER — APPOINTMENT (OUTPATIENT)
Age: 50
End: 2025-08-06
Attending: STUDENT IN AN ORGANIZED HEALTH CARE EDUCATION/TRAINING PROGRAM
Payer: OTHER GOVERNMENT

## 2025-08-06 ENCOUNTER — HOSPITAL ENCOUNTER (EMERGENCY)
Age: 50
Discharge: HOME | End: 2025-08-06
Attending: STUDENT IN AN ORGANIZED HEALTH CARE EDUCATION/TRAINING PROGRAM
Payer: OTHER GOVERNMENT

## 2025-08-06 ENCOUNTER — APPOINTMENT (OUTPATIENT)
Age: 50
End: 2025-08-06
Payer: OTHER GOVERNMENT

## 2025-08-06 VITALS
BODY MASS INDEX: 28.79 KG/M2 | WEIGHT: 190 LBS | HEART RATE: 97 BPM | DIASTOLIC BLOOD PRESSURE: 74 MMHG | SYSTOLIC BLOOD PRESSURE: 135 MMHG | RESPIRATION RATE: 20 BRPM | HEIGHT: 68 IN | TEMPERATURE: 97.9 F | OXYGEN SATURATION: 100 %

## 2025-08-06 DIAGNOSIS — R11.15 CYCLICAL VOMITING SYNDROME: Primary | ICD-10-CM

## 2025-08-06 LAB
ALBUMIN SERPL-MCNC: 4.2 G/DL (ref 3.4–5)
ALBUMIN/GLOB SERPL: 1.5 (ref 1–1.9)
ALP SERPL-CCNC: 56 U/L (ref 45–117)
ALT SERPL-CCNC: 13 U/L (ref 10–50)
AMPHET UR QL SCN: NEGATIVE
ANION GAP SERPL CALC-SCNC: 21 MMOL/L (ref 7–16)
APPEARANCE UR: CLEAR
AST SERPL-CCNC: 17 U/L (ref 10–38)
BACTERIA URNS QL MICRO: NEGATIVE /HPF
BARBITURATES UR QL SCN: NEGATIVE
BASE EXCESS BLD CALC-SCNC: 0.3 MMOL/L
BASE EXCESS BLDV CALC-SCNC: 0.8 MMOL/L
BENZODIAZ UR QL: NEGATIVE
BILIRUB SERPL-MCNC: 0.5 MG/DL (ref 0.2–1)
BILIRUB UR QL: NEGATIVE
BUN SERPL-MCNC: 7 MG/DL (ref 6–23)
BUN/CREAT SERPL: 7
CALCIUM SERPL-MCNC: 9.9 MG/DL (ref 8.5–10.1)
CANNABINOIDS UR QL SCN: POSITIVE
CHLORIDE SERPL-SCNC: 99 MMOL/L (ref 98–107)
CO2 SERPL-SCNC: 20 MMOL/L (ref 21–32)
COCAINE UR QL SCN: NEGATIVE
COLOR UR: YELLOW
CREAT SERPL-MCNC: 1.02 MG/DL (ref 0.6–1.3)
EKG ATRIAL RATE: 97 BPM
EKG DIAGNOSIS: NORMAL
EKG P AXIS: 43 DEGREES
EKG P-R INTERVAL: 158 MS
EKG Q-T INTERVAL: 310 MS
EKG QRS DURATION: 90 MS
EKG QTC CALCULATION (BAZETT): 393 MS
EKG R AXIS: 48 DEGREES
EKG T AXIS: 47 DEGREES
EKG VENTRICULAR RATE: 97 BPM
EPITH CASTS URNS QL MICRO: NEGATIVE /LPF (ref 0–5)
ERYTHROCYTE [DISTWIDTH] IN BLOOD BY AUTOMATED COUNT: 13.1 % (ref 11.6–14.5)
ETHANOL SERPL-MCNC: <11 MG/DL (ref 0–0.08)
FENTANYL: NEGATIVE
GLOBULIN SER CALC-MCNC: 2.7 G/DL (ref 2.3–3.5)
GLUCOSE BLD STRIP.AUTO-MCNC: 198 MG/DL (ref 70–110)
GLUCOSE SERPL-MCNC: 183 MG/DL (ref 74–108)
GLUCOSE UR STRIP.AUTO-MCNC: 100 MG/DL
HCO3 BLD-SCNC: 21.2 MMOL/L (ref 21–28)
HCO3 BLDV-SCNC: 20.2 MMOL/L (ref 23–28)
HCT VFR BLD AUTO: 30.8 % (ref 36–48)
HGB BLD-MCNC: 11 G/DL (ref 13–16)
HGB UR QL STRIP: NEGATIVE
KETONES UR QL STRIP.AUTO: 40 MG/DL
LACTATE BLD-SCNC: 2.09 MMOL/L (ref 0.4–2)
LACTATE BLD-SCNC: 2.75 MMOL/L (ref 0.4–2)
LEUKOCYTE ESTERASE UR QL STRIP.AUTO: NEGATIVE
LIPASE SERPL-CCNC: 34 U/L (ref 13–75)
Lab: ABNORMAL
MAGNESIUM SERPL-MCNC: 1.9 MG/DL (ref 1.6–2.6)
MCH RBC QN AUTO: 31.1 PG (ref 24–34)
MCHC RBC AUTO-ENTMCNC: 35.7 G/DL (ref 31–37)
MCV RBC AUTO: 87 FL (ref 78–100)
METHADONE UR QL: NEGATIVE
NITRITE UR QL STRIP.AUTO: NEGATIVE
NRBC # BLD: 0 K/UL (ref 0–0.01)
NRBC BLD-RTO: 0 PER 100 WBC
OPIATES UR QL: NEGATIVE
OXYCODONE UR QL SCN: NEGATIVE
PCO2 BLD: 23.9 MMHG (ref 35–48)
PCO2 BLDV: 19.6 MMHG (ref 41–51)
PH BLD: 7.56 (ref 7.35–7.45)
PH BLDV: 7.62 (ref 7.32–7.42)
PH UR STRIP: 8 (ref 5–8)
PLATELET # BLD AUTO: 407 K/UL (ref 135–420)
PMV BLD AUTO: 9.5 FL (ref 9.2–11.8)
PO2 BLD: 43 MMHG (ref 83–108)
PO2 BLDV: 27 MMHG (ref 25–40)
POTASSIUM SERPL-SCNC: 3.4 MMOL/L (ref 3.5–5.5)
PROT SERPL-MCNC: 7 G/DL (ref 6.4–8.2)
PROT UR STRIP-MCNC: ABNORMAL MG/DL
RBC # BLD AUTO: 3.54 M/UL (ref 4.35–5.65)
RBC #/AREA URNS HPF: NORMAL /HPF (ref 0–5)
SAO2 % BLD: 86.2 % (ref 92–97)
SAO2 % BLDV: 66.4 % (ref 65–88)
SERVICE CMNT-IMP: ABNORMAL
SERVICE CMNT-IMP: ABNORMAL
SODIUM SERPL-SCNC: 140 MMOL/L (ref 136–145)
SP GR UR REFRACTOMETRY: 1.01 (ref 1–1.03)
SPECIMEN TYPE: ABNORMAL
SPECIMEN TYPE: ABNORMAL
TROPONIN T SERPL HS-MCNC: 15.5 NG/L (ref 0–22)
TSH, 3RD GENERATION: 0.44 UIU/ML (ref 0.27–4.2)
UROBILINOGEN UR QL STRIP.AUTO: 0.2 EU/DL (ref 0.2–1)
WBC # BLD AUTO: 10.9 K/UL (ref 4.6–13.2)
WBC URNS QL MICRO: NORMAL /HPF (ref 0–4)

## 2025-08-06 PROCEDURE — 80053 COMPREHEN METABOLIC PANEL: CPT

## 2025-08-06 PROCEDURE — 83690 ASSAY OF LIPASE: CPT

## 2025-08-06 PROCEDURE — 71045 X-RAY EXAM CHEST 1 VIEW: CPT

## 2025-08-06 PROCEDURE — 82077 ASSAY SPEC XCP UR&BREATH IA: CPT

## 2025-08-06 PROCEDURE — 81001 URINALYSIS AUTO W/SCOPE: CPT

## 2025-08-06 PROCEDURE — 83735 ASSAY OF MAGNESIUM: CPT

## 2025-08-06 PROCEDURE — 84484 ASSAY OF TROPONIN QUANT: CPT

## 2025-08-06 PROCEDURE — 6360000004 HC RX CONTRAST MEDICATION: Performed by: STUDENT IN AN ORGANIZED HEALTH CARE EDUCATION/TRAINING PROGRAM

## 2025-08-06 PROCEDURE — 99285 EMERGENCY DEPT VISIT HI MDM: CPT

## 2025-08-06 PROCEDURE — 82962 GLUCOSE BLOOD TEST: CPT

## 2025-08-06 PROCEDURE — 96374 THER/PROPH/DIAG INJ IV PUSH: CPT

## 2025-08-06 PROCEDURE — 74177 CT ABD & PELVIS W/CONTRAST: CPT

## 2025-08-06 PROCEDURE — 81003 URINALYSIS AUTO W/O SCOPE: CPT

## 2025-08-06 PROCEDURE — 96375 TX/PRO/DX INJ NEW DRUG ADDON: CPT

## 2025-08-06 PROCEDURE — 2580000003 HC RX 258: Performed by: STUDENT IN AN ORGANIZED HEALTH CARE EDUCATION/TRAINING PROGRAM

## 2025-08-06 PROCEDURE — 96361 HYDRATE IV INFUSION ADD-ON: CPT

## 2025-08-06 PROCEDURE — 93005 ELECTROCARDIOGRAM TRACING: CPT

## 2025-08-06 PROCEDURE — 83605 ASSAY OF LACTIC ACID: CPT

## 2025-08-06 PROCEDURE — 85027 COMPLETE CBC AUTOMATED: CPT

## 2025-08-06 PROCEDURE — 80307 DRUG TEST PRSMV CHEM ANLYZR: CPT

## 2025-08-06 PROCEDURE — 6360000002 HC RX W HCPCS: Performed by: STUDENT IN AN ORGANIZED HEALTH CARE EDUCATION/TRAINING PROGRAM

## 2025-08-06 PROCEDURE — 84443 ASSAY THYROID STIM HORMONE: CPT

## 2025-08-06 PROCEDURE — 6370000000 HC RX 637 (ALT 250 FOR IP): Performed by: STUDENT IN AN ORGANIZED HEALTH CARE EDUCATION/TRAINING PROGRAM

## 2025-08-06 PROCEDURE — 96372 THER/PROPH/DIAG INJ SC/IM: CPT

## 2025-08-06 RX ORDER — 0.9 % SODIUM CHLORIDE 0.9 %
1000 INTRAVENOUS SOLUTION INTRAVENOUS ONCE
Status: COMPLETED | OUTPATIENT
Start: 2025-08-06 | End: 2025-08-06

## 2025-08-06 RX ORDER — ONDANSETRON 4 MG/1
4 TABLET, FILM COATED ORAL DAILY PRN
Qty: 30 TABLET | Refills: 0 | Status: SHIPPED | OUTPATIENT
Start: 2025-08-06

## 2025-08-06 RX ORDER — HALOPERIDOL 5 MG/ML
2.5 INJECTION INTRAMUSCULAR
Status: COMPLETED | OUTPATIENT
Start: 2025-08-06 | End: 2025-08-06

## 2025-08-06 RX ORDER — DICYCLOMINE HCL 20 MG
20 TABLET ORAL 4 TIMES DAILY PRN
Qty: 30 TABLET | Refills: 0 | Status: SHIPPED | OUTPATIENT
Start: 2025-08-06

## 2025-08-06 RX ORDER — ONDANSETRON 2 MG/ML
4 INJECTION INTRAMUSCULAR; INTRAVENOUS ONCE
Status: COMPLETED | OUTPATIENT
Start: 2025-08-06 | End: 2025-08-06

## 2025-08-06 RX ORDER — DIPHENHYDRAMINE HYDROCHLORIDE 50 MG/ML
50 INJECTION, SOLUTION INTRAMUSCULAR; INTRAVENOUS
Status: COMPLETED | OUTPATIENT
Start: 2025-08-06 | End: 2025-08-06

## 2025-08-06 RX ORDER — METOCLOPRAMIDE 10 MG/1
10 TABLET ORAL 4 TIMES DAILY PRN
Qty: 40 TABLET | Refills: 0 | Status: SHIPPED | OUTPATIENT
Start: 2025-08-06

## 2025-08-06 RX ORDER — METOCLOPRAMIDE HYDROCHLORIDE 5 MG/ML
10 INJECTION INTRAMUSCULAR; INTRAVENOUS
Status: COMPLETED | OUTPATIENT
Start: 2025-08-06 | End: 2025-08-06

## 2025-08-06 RX ORDER — IOPAMIDOL 612 MG/ML
100 INJECTION, SOLUTION INTRAVASCULAR
Status: COMPLETED | OUTPATIENT
Start: 2025-08-06 | End: 2025-08-06

## 2025-08-06 RX ADMIN — METOCLOPRAMIDE 10 MG: 5 INJECTION, SOLUTION INTRAMUSCULAR; INTRAVENOUS at 13:35

## 2025-08-06 RX ADMIN — IOPAMIDOL 100 ML: 612 INJECTION, SOLUTION INTRAVENOUS at 14:53

## 2025-08-06 RX ADMIN — POTASSIUM BICARBONATE 40 MEQ: 782 TABLET, EFFERVESCENT ORAL at 15:35

## 2025-08-06 RX ADMIN — DIPHENHYDRAMINE HYDROCHLORIDE 50 MG: 50 INJECTION, SOLUTION INTRAMUSCULAR; INTRAVENOUS at 14:23

## 2025-08-06 RX ADMIN — HALOPERIDOL LACTATE 2.5 MG: 5 INJECTION, SOLUTION INTRAMUSCULAR at 14:25

## 2025-08-06 RX ADMIN — ONDANSETRON 4 MG: 2 INJECTION, SOLUTION INTRAMUSCULAR; INTRAVENOUS at 13:31

## 2025-08-06 RX ADMIN — SODIUM CHLORIDE 1000 ML: 0.9 INJECTION, SOLUTION INTRAVENOUS at 13:35

## 2025-08-06 ASSESSMENT — PAIN - FUNCTIONAL ASSESSMENT: PAIN_FUNCTIONAL_ASSESSMENT: 0-10

## 2025-08-06 ASSESSMENT — PAIN SCALES - GENERAL: PAINLEVEL_OUTOF10: 10
